# Patient Record
Sex: FEMALE | Race: OTHER | Employment: OTHER | URBAN - NONMETROPOLITAN AREA
[De-identification: names, ages, dates, MRNs, and addresses within clinical notes are randomized per-mention and may not be internally consistent; named-entity substitution may affect disease eponyms.]

---

## 2017-08-02 PROBLEM — I48.0 PAROXYSMAL ATRIAL FIBRILLATION (HCC): Status: ACTIVE | Noted: 2017-08-02

## 2018-02-23 ENCOUNTER — APPOINTMENT (OUTPATIENT)
Dept: CT IMAGING | Age: 83
End: 2018-02-23
Payer: MEDICARE

## 2018-02-23 ENCOUNTER — HOSPITAL ENCOUNTER (EMERGENCY)
Age: 83
Discharge: HOME | End: 2018-02-23
Attending: EMERGENCY MEDICINE
Payer: MEDICARE

## 2018-02-23 VITALS
OXYGEN SATURATION: 100 % | RESPIRATION RATE: 10 BRPM | BODY MASS INDEX: 30.3 KG/M2 | DIASTOLIC BLOOD PRESSURE: 82 MMHG | TEMPERATURE: 97.8 F | WEIGHT: 150 LBS | HEART RATE: 52 BPM | SYSTOLIC BLOOD PRESSURE: 182 MMHG

## 2018-02-23 DIAGNOSIS — R40.4 TRANSIENT ALTERATION OF AWARENESS: Primary | ICD-10-CM

## 2018-02-23 LAB
-: ABNORMAL
ABSOLUTE EOS #: 0.1 K/UL (ref 0–0.4)
ABSOLUTE IMMATURE GRANULOCYTE: ABNORMAL K/UL (ref 0–0.3)
ABSOLUTE LYMPH #: 0.8 K/UL (ref 1–4.8)
ABSOLUTE MONO #: 0.4 K/UL (ref 0–1)
ALBUMIN SERPL-MCNC: 3.7 G/DL (ref 3.5–5.2)
ALBUMIN/GLOBULIN RATIO: 0.8 (ref 1–2.5)
ALP BLD-CCNC: 50 U/L (ref 35–104)
ALT SERPL-CCNC: 13 U/L (ref 5–33)
AMORPHOUS: ABNORMAL
AMPHETAMINE SCREEN URINE: NEGATIVE
ANION GAP SERPL CALCULATED.3IONS-SCNC: 12 MMOL/L (ref 9–17)
AST SERPL-CCNC: 22 U/L
BACTERIA: ABNORMAL
BARBITURATE SCREEN URINE: NEGATIVE
BASOPHILS # BLD: 0 % (ref 0–2)
BASOPHILS ABSOLUTE: 0 K/UL (ref 0–0.2)
BENZODIAZEPINE SCREEN, URINE: NEGATIVE
BILIRUB SERPL-MCNC: 0.37 MG/DL (ref 0.3–1.2)
BILIRUBIN URINE: NEGATIVE
BUN BLDV-MCNC: 15 MG/DL (ref 8–23)
BUN/CREAT BLD: 19 (ref 9–20)
BUPRENORPHINE URINE: NEGATIVE
CALCIUM SERPL-MCNC: 9.1 MG/DL (ref 8.6–10.4)
CANNABINOID SCREEN URINE: NEGATIVE
CASTS UA: ABNORMAL /LPF
CHLORIDE BLD-SCNC: 97 MMOL/L (ref 98–107)
CHP ED QC CHECK: NORMAL
CO2: 25 MMOL/L (ref 20–31)
COCAINE METABOLITE, URINE: NEGATIVE
COLOR: YELLOW
COMMENT UA: ABNORMAL
CREAT SERPL-MCNC: 0.77 MG/DL (ref 0.5–0.9)
CRYSTALS, UA: ABNORMAL /HPF
DIFFERENTIAL TYPE: ABNORMAL
EKG ATRIAL RATE: 57 BPM
EKG P AXIS: 78 DEGREES
EKG P-R INTERVAL: 160 MS
EKG Q-T INTERVAL: 514 MS
EKG QRS DURATION: 106 MS
EKG QTC CALCULATION (BAZETT): 500 MS
EKG R AXIS: -54 DEGREES
EKG T AXIS: 23 DEGREES
EKG VENTRICULAR RATE: 57 BPM
EOSINOPHILS RELATIVE PERCENT: 1 % (ref 0–8)
EPITHELIAL CELLS UA: ABNORMAL /HPF (ref 0–25)
GFR AFRICAN AMERICAN: >60 ML/MIN
GFR NON-AFRICAN AMERICAN: >60 ML/MIN
GFR SERPL CREATININE-BSD FRML MDRD: ABNORMAL ML/MIN/{1.73_M2}
GFR SERPL CREATININE-BSD FRML MDRD: ABNORMAL ML/MIN/{1.73_M2}
GLUCOSE BLD-MCNC: 82 MG/DL
GLUCOSE BLD-MCNC: 82 MG/DL (ref 74–100)
GLUCOSE BLD-MCNC: 83 MG/DL (ref 70–99)
GLUCOSE URINE: NEGATIVE
HCT VFR BLD CALC: 37.8 % (ref 36–46)
HEMOGLOBIN: 12.7 G/DL (ref 12–16)
IMMATURE GRANULOCYTES: ABNORMAL %
INR BLD: 1 (ref 0.9–1.2)
KETONES, URINE: NEGATIVE
LEUKOCYTE ESTERASE, URINE: NEGATIVE
LYMPHOCYTES # BLD: 12 % (ref 24–44)
MCH RBC QN AUTO: 31.9 PG (ref 26–34)
MCHC RBC AUTO-ENTMCNC: 33.7 G/DL (ref 31–37)
MCV RBC AUTO: 94.8 FL (ref 80–100)
MDMA URINE: NORMAL
METHADONE SCREEN, URINE: NEGATIVE
METHAMPHETAMINE, URINE: NEGATIVE
MONOCYTES # BLD: 6 % (ref 0–12)
MUCUS: ABNORMAL
NITRITE, URINE: NEGATIVE
NRBC AUTOMATED: ABNORMAL PER 100 WBC
OPIATES, URINE: NEGATIVE
OTHER OBSERVATIONS UA: ABNORMAL
OXYCODONE SCREEN URINE: NEGATIVE
PARTIAL THROMBOPLASTIN TIME: 22.3 SEC (ref 23.2–34.4)
PDW BLD-RTO: 13.9 % (ref 12.1–15.2)
PH UA: 6 (ref 5–9)
PHENCYCLIDINE, URINE: NEGATIVE
PLATELET # BLD: 151 K/UL (ref 140–450)
PLATELET ESTIMATE: ABNORMAL
PMV BLD AUTO: 10.7 FL (ref 6–12)
POTASSIUM SERPL-SCNC: 4.2 MMOL/L (ref 3.7–5.3)
PROPOXYPHENE, URINE: NEGATIVE
PROTEIN UA: NEGATIVE
PROTHROMBIN TIME: 10.6 SEC (ref 9.7–12.2)
RBC # BLD: 3.98 M/UL (ref 4–5.2)
RBC # BLD: ABNORMAL 10*6/UL
RBC UA: ABNORMAL /HPF (ref 0–2)
RENAL EPITHELIAL, UA: ABNORMAL /HPF
SEG NEUTROPHILS: 81 % (ref 36–66)
SEGMENTED NEUTROPHILS ABSOLUTE COUNT: 5.5 K/UL (ref 1.8–7.7)
SODIUM BLD-SCNC: 134 MMOL/L (ref 135–144)
SPECIFIC GRAVITY UA: 1.01 (ref 1.01–1.02)
TEST INFORMATION: NORMAL
TOTAL PROTEIN: 8.3 G/DL (ref 6.4–8.3)
TRICHOMONAS: ABNORMAL
TRICYCLIC ANTIDEPRESSANTS, UR: NEGATIVE
TURBIDITY: CLEAR
URINE HGB: NEGATIVE
UROBILINOGEN, URINE: ABNORMAL
WBC # BLD: 6.8 K/UL (ref 3.5–11)
WBC # BLD: ABNORMAL 10*3/UL
WBC UA: ABNORMAL /HPF (ref 0–5)
YEAST: ABNORMAL

## 2018-02-23 PROCEDURE — 85025 COMPLETE CBC W/AUTO DIFF WBC: CPT

## 2018-02-23 PROCEDURE — 85610 PROTHROMBIN TIME: CPT

## 2018-02-23 PROCEDURE — 80053 COMPREHEN METABOLIC PANEL: CPT

## 2018-02-23 PROCEDURE — 81001 URINALYSIS AUTO W/SCOPE: CPT

## 2018-02-23 PROCEDURE — 82947 ASSAY GLUCOSE BLOOD QUANT: CPT

## 2018-02-23 PROCEDURE — 99285 EMERGENCY DEPT VISIT HI MDM: CPT

## 2018-02-23 PROCEDURE — 80306 DRUG TEST PRSMV INSTRMNT: CPT

## 2018-02-23 PROCEDURE — 85730 THROMBOPLASTIN TIME PARTIAL: CPT

## 2018-02-23 PROCEDURE — 93005 ELECTROCARDIOGRAM TRACING: CPT

## 2018-02-23 PROCEDURE — 70450 CT HEAD/BRAIN W/O DYE: CPT

## 2018-02-23 ASSESSMENT — ENCOUNTER SYMPTOMS
CONSTIPATION: 0
BACK PAIN: 0
VOMITING: 0
ABDOMINAL PAIN: 0
SORE THROAT: 0
DIARRHEA: 0
EYE REDNESS: 0
CHEST TIGHTNESS: 0
BLOOD IN STOOL: 0
COUGH: 0
NAUSEA: 0
SHORTNESS OF BREATH: 0
EYE DISCHARGE: 0
RHINORRHEA: 0
WHEEZING: 0

## 2018-02-23 NOTE — ED NOTES
Bed: 02  Expected date:   Expected time:   Means of arrival:   Comments:  [facility]  02/23/18 0339

## 2018-02-23 NOTE — ED NOTES
Daughter states that pt does not take medications even when given to her. Daughter states that pt hides her meds.       Bita Panda RN  02/23/18 9145

## 2018-02-23 NOTE — ED NOTES
Pt ambulates down the reina, slightly unsteady reaches out for hand rail at times. Upon return to room states I do feel a little dizzy but I'm OK. Continues to deny help from RN. Pt remains very pleasant and smiling. Family states \"this is how she acts . .. At home she take her pills in her hand and sneaks over to the sink and throws out them down the garbage disposal instead of taking them, she always refuses help just very proud\".      Neel Davis RN  02/23/18 3051

## 2018-02-23 NOTE — ED NOTES
Family updated on plan of care. Family concerned about BP, explained that physician will be in to discuss test results shortly.       Karey Degroot RN  02/23/18 1907

## 2018-02-24 NOTE — ED NOTES
Patient ambulated to restroom with stand by assist. Patient tolerated well. Patient had little to no gait disturbance.       Michelle Pacheco RN  02/23/18 1945

## 2019-03-26 ENCOUNTER — HOSPITAL ENCOUNTER (OUTPATIENT)
Age: 84
Discharge: HOME | End: 2019-03-26
Payer: MEDICARE

## 2019-03-26 DIAGNOSIS — E78.5 HYPERLIPIDEMIA, UNSPECIFIED HYPERLIPIDEMIA TYPE: ICD-10-CM

## 2019-03-26 DIAGNOSIS — E11.9 DIABETES MELLITUS WITHOUT COMPLICATION (HCC): ICD-10-CM

## 2019-03-26 DIAGNOSIS — I10 ESSENTIAL HYPERTENSION: ICD-10-CM

## 2019-03-26 LAB
ALBUMIN SERPL-MCNC: 3.5 G/DL (ref 3.5–5.2)
ALBUMIN/GLOBULIN RATIO: 0.8 (ref 1–2.5)
ALP BLD-CCNC: 58 U/L (ref 35–104)
ALT SERPL-CCNC: 11 U/L (ref 5–33)
ANION GAP SERPL CALCULATED.3IONS-SCNC: 11 MMOL/L (ref 9–17)
AST SERPL-CCNC: 25 U/L
BILIRUB SERPL-MCNC: 0.63 MG/DL (ref 0.3–1.2)
BUN BLDV-MCNC: 22 MG/DL (ref 8–23)
BUN/CREAT BLD: 22 (ref 9–20)
CALCIUM SERPL-MCNC: 9.7 MG/DL (ref 8.6–10.4)
CHLORIDE BLD-SCNC: 93 MMOL/L (ref 98–107)
CHOLESTEROL/HDL RATIO: 2.8
CHOLESTEROL: 182 MG/DL
CO2: 27 MMOL/L (ref 20–31)
CREAT SERPL-MCNC: 0.98 MG/DL (ref 0.5–0.9)
GFR AFRICAN AMERICAN: >60 ML/MIN
GFR NON-AFRICAN AMERICAN: 54 ML/MIN
GFR SERPL CREATININE-BSD FRML MDRD: ABNORMAL ML/MIN/{1.73_M2}
GFR SERPL CREATININE-BSD FRML MDRD: ABNORMAL ML/MIN/{1.73_M2}
GLUCOSE BLD-MCNC: 126 MG/DL (ref 70–99)
HCT VFR BLD CALC: 39.5 % (ref 36.3–47.1)
HDLC SERPL-MCNC: 65 MG/DL
HEMOGLOBIN: 13.6 G/DL (ref 11.9–15.1)
LDL CHOLESTEROL: 98 MG/DL (ref 0–130)
MCH RBC QN AUTO: 31.9 PG (ref 25.2–33.5)
MCHC RBC AUTO-ENTMCNC: 34.4 G/DL (ref 28.4–34.8)
MCV RBC AUTO: 92.7 FL (ref 82.6–102.9)
NRBC AUTOMATED: 0 PER 100 WBC
PDW BLD-RTO: 13.2 % (ref 11.8–14.4)
PLATELET # BLD: 141 K/UL (ref 138–453)
PMV BLD AUTO: 11.8 FL (ref 8.1–13.5)
POTASSIUM SERPL-SCNC: 4.6 MMOL/L (ref 3.7–5.3)
RBC # BLD: 4.26 M/UL (ref 3.95–5.11)
SODIUM BLD-SCNC: 131 MMOL/L (ref 135–144)
TOTAL PROTEIN: 7.7 G/DL (ref 6.4–8.3)
TRIGL SERPL-MCNC: 95 MG/DL
TSH SERPL DL<=0.05 MIU/L-ACNC: 0.68 MIU/L (ref 0.3–5)
VLDLC SERPL CALC-MCNC: NORMAL MG/DL (ref 1–30)
WBC # BLD: 6 K/UL (ref 3.5–11.3)

## 2019-03-26 PROCEDURE — 80061 LIPID PANEL: CPT

## 2019-03-26 PROCEDURE — 80053 COMPREHEN METABOLIC PANEL: CPT

## 2019-03-26 PROCEDURE — 85027 COMPLETE CBC AUTOMATED: CPT

## 2019-03-26 PROCEDURE — 84443 ASSAY THYROID STIM HORMONE: CPT

## 2019-03-26 PROCEDURE — 36415 COLL VENOUS BLD VENIPUNCTURE: CPT

## 2019-05-21 ENCOUNTER — HOSPITAL ENCOUNTER (EMERGENCY)
Age: 84
Discharge: HOME | End: 2019-05-21
Attending: EMERGENCY MEDICINE
Payer: MEDICARE

## 2019-05-21 VITALS
TEMPERATURE: 96.9 F | RESPIRATION RATE: 21 BRPM | OXYGEN SATURATION: 100 % | SYSTOLIC BLOOD PRESSURE: 168 MMHG | HEART RATE: 63 BPM | DIASTOLIC BLOOD PRESSURE: 91 MMHG

## 2019-05-21 DIAGNOSIS — R11.2 NON-INTRACTABLE VOMITING WITH NAUSEA, UNSPECIFIED VOMITING TYPE: Primary | ICD-10-CM

## 2019-05-21 LAB
ABSOLUTE EOS #: 0 K/UL (ref 0–0.44)
ABSOLUTE IMMATURE GRANULOCYTE: 0 K/UL (ref 0–0.3)
ABSOLUTE LYMPH #: 0.81 K/UL (ref 1.1–3.7)
ABSOLUTE MONO #: 0.37 K/UL (ref 0.1–1.2)
ALBUMIN SERPL-MCNC: 3.4 G/DL (ref 3.5–5.2)
ALBUMIN/GLOBULIN RATIO: 0.9 (ref 1–2.5)
ALP BLD-CCNC: 70 U/L (ref 35–104)
ALT SERPL-CCNC: 8 U/L (ref 5–33)
ANION GAP SERPL CALCULATED.3IONS-SCNC: 12 MMOL/L (ref 9–17)
AST SERPL-CCNC: 19 U/L
BASOPHILS # BLD: 1 % (ref 0–2)
BASOPHILS ABSOLUTE: 0.06 K/UL (ref 0–0.2)
BILIRUB SERPL-MCNC: 0.32 MG/DL (ref 0.3–1.2)
BUN BLDV-MCNC: 14 MG/DL (ref 8–23)
BUN/CREAT BLD: 18 (ref 9–20)
CALCIUM SERPL-MCNC: 9 MG/DL (ref 8.6–10.4)
CHLORIDE BLD-SCNC: 96 MMOL/L (ref 98–107)
CO2: 24 MMOL/L (ref 20–31)
CREAT SERPL-MCNC: 0.76 MG/DL (ref 0.5–0.9)
DIFFERENTIAL TYPE: ABNORMAL
EOSINOPHILS RELATIVE PERCENT: 0 % (ref 1–4)
GFR AFRICAN AMERICAN: >60 ML/MIN
GFR NON-AFRICAN AMERICAN: >60 ML/MIN
GFR SERPL CREATININE-BSD FRML MDRD: ABNORMAL ML/MIN/{1.73_M2}
GFR SERPL CREATININE-BSD FRML MDRD: ABNORMAL ML/MIN/{1.73_M2}
GLUCOSE BLD-MCNC: 170 MG/DL (ref 70–99)
HCT VFR BLD CALC: 35.5 % (ref 36.3–47.1)
HEMOGLOBIN: 11.8 G/DL (ref 11.9–15.1)
IMMATURE GRANULOCYTES: 0 %
LIPASE: 44 U/L (ref 13–60)
LYMPHOCYTES # BLD: 13 % (ref 24–43)
MAGNESIUM: 1.9 MG/DL (ref 1.6–2.6)
MCH RBC QN AUTO: 32.1 PG (ref 25.2–33.5)
MCHC RBC AUTO-ENTMCNC: 33.2 G/DL (ref 28.4–34.8)
MCV RBC AUTO: 96.5 FL (ref 82.6–102.9)
MONOCYTES # BLD: 6 % (ref 3–12)
MORPHOLOGY: NORMAL
NRBC AUTOMATED: 0 PER 100 WBC
PDW BLD-RTO: 13.8 % (ref 11.8–14.4)
PLATELET # BLD: 146 K/UL (ref 138–453)
PLATELET ESTIMATE: ABNORMAL
PMV BLD AUTO: 11 FL (ref 8.1–13.5)
POTASSIUM SERPL-SCNC: 3.4 MMOL/L (ref 3.7–5.3)
RBC # BLD: 3.68 M/UL (ref 3.95–5.11)
RBC # BLD: ABNORMAL 10*6/UL
SEG NEUTROPHILS: 80 % (ref 36–65)
SEGMENTED NEUTROPHILS ABSOLUTE COUNT: 4.96 K/UL (ref 1.5–8.1)
SODIUM BLD-SCNC: 132 MMOL/L (ref 135–144)
TOTAL PROTEIN: 7.4 G/DL (ref 6.4–8.3)
WBC # BLD: 6.2 K/UL (ref 3.5–11.3)
WBC # BLD: ABNORMAL 10*3/UL

## 2019-05-21 PROCEDURE — 85025 COMPLETE CBC W/AUTO DIFF WBC: CPT

## 2019-05-21 PROCEDURE — 93005 ELECTROCARDIOGRAM TRACING: CPT | Performed by: EMERGENCY MEDICINE

## 2019-05-21 PROCEDURE — 6360000002 HC RX W HCPCS: Performed by: EMERGENCY MEDICINE

## 2019-05-21 PROCEDURE — 99284 EMERGENCY DEPT VISIT MOD MDM: CPT

## 2019-05-21 PROCEDURE — 36415 COLL VENOUS BLD VENIPUNCTURE: CPT

## 2019-05-21 PROCEDURE — 83690 ASSAY OF LIPASE: CPT

## 2019-05-21 PROCEDURE — 2580000003 HC RX 258: Performed by: EMERGENCY MEDICINE

## 2019-05-21 PROCEDURE — 96374 THER/PROPH/DIAG INJ IV PUSH: CPT

## 2019-05-21 PROCEDURE — 83735 ASSAY OF MAGNESIUM: CPT

## 2019-05-21 PROCEDURE — 80053 COMPREHEN METABOLIC PANEL: CPT

## 2019-05-21 PROCEDURE — 93005 ELECTROCARDIOGRAM TRACING: CPT

## 2019-05-21 RX ORDER — ONDANSETRON 4 MG/1
4 TABLET, ORALLY DISINTEGRATING ORAL EVERY 8 HOURS PRN
Qty: 10 TABLET | Refills: 0 | Status: SHIPPED | OUTPATIENT
Start: 2019-05-21 | End: 2019-12-17

## 2019-05-21 RX ORDER — ONDANSETRON 2 MG/ML
4 INJECTION INTRAMUSCULAR; INTRAVENOUS ONCE
Status: COMPLETED | OUTPATIENT
Start: 2019-05-21 | End: 2019-05-21

## 2019-05-21 RX ORDER — 0.9 % SODIUM CHLORIDE 0.9 %
1000 INTRAVENOUS SOLUTION INTRAVENOUS ONCE
Status: COMPLETED | OUTPATIENT
Start: 2019-05-21 | End: 2019-05-21

## 2019-05-21 RX ADMIN — ONDANSETRON 4 MG: 2 INJECTION INTRAMUSCULAR; INTRAVENOUS at 18:58

## 2019-05-21 RX ADMIN — SODIUM CHLORIDE 1000 ML: 9 INJECTION, SOLUTION INTRAVENOUS at 18:57

## 2019-05-21 ASSESSMENT — ENCOUNTER SYMPTOMS
NAUSEA: 1
BACK PAIN: 0
VOMITING: 1
ABDOMINAL PAIN: 0
COUGH: 0
DIARRHEA: 0
FACIAL SWELLING: 0
SHORTNESS OF BREATH: 0

## 2019-05-21 NOTE — ED PROVIDER NOTES
name: None    Number of children: None    Years of education: None    Highest education level: None   Occupational History    None   Social Needs    Financial resource strain: None    Food insecurity:     Worry: None     Inability: None    Transportation needs:     Medical: None     Non-medical: None   Tobacco Use    Smoking status: Never Smoker    Smokeless tobacco: Never Used   Substance and Sexual Activity    Alcohol use: No    Drug use: None    Sexual activity: None   Lifestyle    Physical activity:     Days per week: None     Minutes per session: None    Stress: None   Relationships    Social connections:     Talks on phone: None     Gets together: None     Attends Faith service: None     Active member of club or organization: None     Attends meetings of clubs or organizations: None     Relationship status: None    Intimate partner violence:     Fear of current or ex partner: None     Emotionally abused: None     Physically abused: None     Forced sexual activity: None   Other Topics Concern    None   Social History Narrative    None       SCREENINGS      @FLOW([id number])@      PHYSICAL EXAM    (up to 7 for level 4, 8 or more for level 5)     ED Triage Vitals [05/21/19 1829]   BP Temp Temp Source Pulse Resp SpO2 Height Weight   (!) 205/84 96.9 °F (36.1 °C) Tympanic 53 13 100 % -- --       Physical Exam   Constitutional: She is oriented to person, place, and time. She appears well-developed and well-nourished. No distress. HENT:   Head: Normocephalic and atraumatic. Mouth/Throat: Oropharynx is clear and moist.   Eyes: Pupils are equal, round, and reactive to light. Conjunctivae and EOM are normal. Right eye exhibits no discharge. Left eye exhibits no discharge. Neck: Normal range of motion. Neck supple. Cardiovascular: Normal rate and regular rhythm. No murmur heard. Pulmonary/Chest: Effort normal and breath sounds normal. No stridor. No respiratory distress. Abdominal: Soft. Bowel sounds are normal. She exhibits no distension. There is no tenderness. Musculoskeletal: Normal range of motion. Neurological: She is alert and oriented to person, place, and time. No cranial nerve deficit. Skin: Skin is warm and dry. Nursing note and vitals reviewed. DIAGNOSTIC RESULTS     EKG: All EKG's are interpreted by the Emergency Department Physician who either signs or Co-signsthis chart in the absence of a cardiologist.  EKG demonstrates sinus rhythm. There is a rate of 60. Mildly prolonged QT, unchanged. No acute ischemia. RADIOLOGY:   Non-plain filmimages such as CT, Ultrasound and MRI are read by the radiologist. Plain radiographic images are visualized and preliminarily interpreted by the emergency physician with the below findings:        Interpretation per the Radiologist below, if available at the time ofthis note:    No orders to display         ED BEDSIDE ULTRASOUND:   Performed by ED Physician - none    LABS:  Labs Reviewed   CBC WITH AUTO DIFFERENTIAL - Abnormal; Notable for the following components:       Result Value    RBC 3.68 (*)     Hemoglobin 11.8 (*)     Hematocrit 35.5 (*)     All other components within normal limits   COMPREHENSIVE METABOLIC PANEL W/ REFLEX TO MG FOR LOW K - Abnormal; Notable for the following components:    Glucose 170 (*)     Sodium 132 (*)     Potassium 3.4 (*)     Chloride 96 (*)     Alb 3.4 (*)     Albumin/Globulin Ratio 0.9 (*)     All other components within normal limits   LIPASE   MAGNESIUM   URINE RT REFLEX TO CULTURE       All other labs were within normal range or not returned as of this dictation. EMERGENCY DEPARTMENT COURSE and DIFFERENTIAL DIAGNOSIS/MDM:   Vitals:    Vitals:    05/21/19 1829 05/21/19 1903 05/21/19 1916   BP: (!) 205/84 (!) 198/103 (!) 168/91   Pulse: 53 54 63   Resp: 13 11 21   Temp: 96.9 °F (36.1 °C)     TempSrc: Tympanic     SpO2: 100%         Patient presents with nausea and vomiting.   Her abdomen is completely benign. She's had no chest pain or dyspnea. On arrival, the patient was hypertensive, but has not taken her medications. Without any intervention, her blood pressure trended back towards the normal range. The patient was given fluids and Zofran. Labs are obtained. Renal function is within normal limits. Electrolytes are basically unremarkable. The patient is feeling markedly improved. She is able to drink without issue. This point, I do not suspect a dangerous process. I do feel the patient is safe for outpatient therapy. She was counseled concerning symptoms. She will be discharged home. MDM        CONSULTS:  None    PROCEDURES:  Unless otherwise noted below, none     Procedures    FINAL IMPRESSION      1. Non-intractable vomiting with nausea, unspecified vomiting type        DISPOSITION/PLAN   DISPOSITION        PATIENT REFERRED TO:  Jessica Morrow MD  [facility] [address]  [phone]    In 2 days  or return if symptoms worsen      DISCHARGE MEDICATIONS:  New Prescriptions    ONDANSETRON (ZOFRAN ODT) 4 MG DISINTEGRATING TABLET    Take 1 tablet by mouth every 8 hours as needed for Nausea          (Please note that portions of this note were completed with a voice recognitionprogram.  Efforts were made to edit the dictations but occasionally words are mis-transcribed. )    Judy Alejandro MD (electronically signed)  Attending Emergency Physician         Judy Alejandro MD  05/21/19 1565

## 2019-05-21 NOTE — ED NOTES
Bed: 05  Expected date:   Expected time:   Means of arrival:   Comments:  julius Barrett, [facility]  05/21/19 0728

## 2019-05-22 LAB
EKG ATRIAL RATE: 60 BPM
EKG P AXIS: 85 DEGREES
EKG P-R INTERVAL: 202 MS
EKG Q-T INTERVAL: 494 MS
EKG QRS DURATION: 96 MS
EKG QTC CALCULATION (BAZETT): 494 MS
EKG R AXIS: -64 DEGREES
EKG T AXIS: 12 DEGREES
EKG VENTRICULAR RATE: 60 BPM

## 2019-05-22 NOTE — ED NOTES
Patient assisted to bathroom. Tolerates crackers and water before discharge.         Isauro Alvarenga RN  05/21/19 2014

## 2019-11-23 ENCOUNTER — HOSPITAL ENCOUNTER (EMERGENCY)
Age: 84
Discharge: ANOTHER HOSPITAL | End: 2019-11-23
Attending: EMERGENCY MEDICINE
Payer: MEDICARE

## 2019-11-23 ENCOUNTER — APPOINTMENT (OUTPATIENT)
Dept: CT IMAGING | Age: 84
End: 2019-11-23
Payer: MEDICARE

## 2019-11-23 VITALS
SYSTOLIC BLOOD PRESSURE: 171 MMHG | RESPIRATION RATE: 16 BRPM | HEART RATE: 69 BPM | OXYGEN SATURATION: 92 % | DIASTOLIC BLOOD PRESSURE: 114 MMHG | BODY MASS INDEX: 25.52 KG/M2 | WEIGHT: 130 LBS | HEIGHT: 60 IN

## 2019-11-23 DIAGNOSIS — I63.9 ISCHEMIC STROKE (HCC): Primary | ICD-10-CM

## 2019-11-23 LAB
% CKMB: 4.6 % (ref 0–3)
ABSOLUTE EOS #: 0.18 K/UL (ref 0–0.44)
ABSOLUTE IMMATURE GRANULOCYTE: <0.03 K/UL (ref 0–0.3)
ABSOLUTE LYMPH #: 1.81 K/UL (ref 1.1–3.7)
ABSOLUTE MONO #: 0.57 K/UL (ref 0.1–1.2)
ANION GAP SERPL CALCULATED.3IONS-SCNC: 15 MMOL/L (ref 9–17)
BASOPHILS # BLD: 1 % (ref 0–2)
BASOPHILS ABSOLUTE: 0.03 K/UL (ref 0–0.2)
BUN BLDV-MCNC: 16 MG/DL (ref 8–23)
BUN/CREAT BLD: 19 (ref 9–20)
CALCIUM SERPL-MCNC: 9.1 MG/DL (ref 8.6–10.4)
CHLORIDE BLD-SCNC: 97 MMOL/L (ref 98–107)
CK MB: 3.6 NG/ML
CKMB INTERPRETATION: ABNORMAL
CO2: 21 MMOL/L (ref 20–31)
CREAT SERPL-MCNC: 0.83 MG/DL (ref 0.5–0.9)
DIFFERENTIAL TYPE: ABNORMAL
EOSINOPHILS RELATIVE PERCENT: 3 % (ref 1–4)
GFR AFRICAN AMERICAN: >60 ML/MIN
GFR NON-AFRICAN AMERICAN: >60 ML/MIN
GFR SERPL CREATININE-BSD FRML MDRD: ABNORMAL ML/MIN/{1.73_M2}
GFR SERPL CREATININE-BSD FRML MDRD: ABNORMAL ML/MIN/{1.73_M2}
GLUCOSE BLD-MCNC: 140 MG/DL (ref 74–100)
GLUCOSE BLD-MCNC: 144 MG/DL (ref 70–99)
HCT VFR BLD CALC: 32.4 % (ref 36.3–47.1)
HEMOGLOBIN: 10.9 G/DL (ref 11.9–15.1)
IMMATURE GRANULOCYTES: 0 %
INR BLD: 1 (ref 0.9–1.2)
LYMPHOCYTES # BLD: 30 % (ref 24–43)
MCH RBC QN AUTO: 32 PG (ref 25.2–33.5)
MCHC RBC AUTO-ENTMCNC: 33.6 G/DL (ref 28.4–34.8)
MCV RBC AUTO: 95 FL (ref 82.6–102.9)
MONOCYTES # BLD: 9 % (ref 3–12)
MYOGLOBIN: 53 NG/ML (ref 25–58)
NRBC AUTOMATED: 0 PER 100 WBC
PARTIAL THROMBOPLASTIN TIME: 20.9 SEC (ref 23.2–34.4)
PDW BLD-RTO: 14.1 % (ref 11.8–14.4)
PLATELET # BLD: 152 K/UL (ref 138–453)
PLATELET ESTIMATE: ABNORMAL
PMV BLD AUTO: 11.8 FL (ref 8.1–13.5)
POTASSIUM SERPL-SCNC: 3.3 MMOL/L (ref 3.7–5.3)
PROTHROMBIN TIME: 10.2 SEC (ref 9.7–12.2)
RBC # BLD: 3.41 M/UL (ref 3.95–5.11)
RBC # BLD: ABNORMAL 10*6/UL
SEG NEUTROPHILS: 57 % (ref 36–65)
SEGMENTED NEUTROPHILS ABSOLUTE COUNT: 3.53 K/UL (ref 1.5–8.1)
SODIUM BLD-SCNC: 133 MMOL/L (ref 135–144)
TOTAL CK: 78 U/L (ref 26–192)
TROPONIN INTERP: ABNORMAL
TROPONIN T: <0.03 NG/ML
TROPONIN, HIGH SENSITIVITY: ABNORMAL NG/L (ref 0–14)
WBC # BLD: 6.1 K/UL (ref 3.5–11.3)
WBC # BLD: ABNORMAL 10*3/UL

## 2019-11-23 PROCEDURE — 70450 CT HEAD/BRAIN W/O DYE: CPT

## 2019-11-23 PROCEDURE — 82550 ASSAY OF CK (CPK): CPT

## 2019-11-23 PROCEDURE — 82947 ASSAY GLUCOSE BLOOD QUANT: CPT

## 2019-11-23 PROCEDURE — 2580000003 HC RX 258: Performed by: EMERGENCY MEDICINE

## 2019-11-23 PROCEDURE — 82553 CREATINE MB FRACTION: CPT

## 2019-11-23 PROCEDURE — 80048 BASIC METABOLIC PNL TOTAL CA: CPT

## 2019-11-23 PROCEDURE — 84484 ASSAY OF TROPONIN QUANT: CPT

## 2019-11-23 PROCEDURE — G0426 INPT/ED TELECONSULT50: HCPCS | Performed by: PSYCHIATRY & NEUROLOGY

## 2019-11-23 PROCEDURE — 83874 ASSAY OF MYOGLOBIN: CPT

## 2019-11-23 PROCEDURE — 2500000003 HC RX 250 WO HCPCS: Performed by: EMERGENCY MEDICINE

## 2019-11-23 PROCEDURE — 96365 THER/PROPH/DIAG IV INF INIT: CPT

## 2019-11-23 PROCEDURE — 37195 THROMBOLYTIC THERAPY STROKE: CPT

## 2019-11-23 PROCEDURE — 99285 EMERGENCY DEPT VISIT HI MDM: CPT

## 2019-11-23 PROCEDURE — 85610 PROTHROMBIN TIME: CPT

## 2019-11-23 PROCEDURE — 85730 THROMBOPLASTIN TIME PARTIAL: CPT

## 2019-11-23 PROCEDURE — 6360000002 HC RX W HCPCS: Performed by: EMERGENCY MEDICINE

## 2019-11-23 PROCEDURE — 85025 COMPLETE CBC W/AUTO DIFF WBC: CPT

## 2019-11-23 PROCEDURE — 96368 THER/DIAG CONCURRENT INF: CPT

## 2019-11-23 RX ORDER — LABETALOL HYDROCHLORIDE 5 MG/ML
10 INJECTION, SOLUTION INTRAVENOUS ONCE
Status: DISCONTINUED | OUTPATIENT
Start: 2019-11-23 | End: 2019-11-23

## 2019-11-23 RX ORDER — DEXTROSE MONOHYDRATE 25 G/50ML
12.5 INJECTION, SOLUTION INTRAVENOUS
Status: DISCONTINUED | OUTPATIENT
Start: 2019-11-23 | End: 2019-11-23 | Stop reason: HOSPADM

## 2019-11-23 RX ORDER — 0.9 % SODIUM CHLORIDE 0.9 %
1000 INTRAVENOUS SOLUTION INTRAVENOUS ONCE
Status: COMPLETED | OUTPATIENT
Start: 2019-11-23 | End: 2019-11-23

## 2019-11-23 RX ORDER — SODIUM CHLORIDE 0.9 % (FLUSH) 0.9 %
10 SYRINGE (ML) INJECTION EVERY 12 HOURS SCHEDULED
Status: DISCONTINUED | OUTPATIENT
Start: 2019-11-23 | End: 2019-11-24 | Stop reason: HOSPADM

## 2019-11-23 RX ORDER — 0.9 % SODIUM CHLORIDE 0.9 %
50 INTRAVENOUS SOLUTION INTRAVENOUS ONCE
Status: COMPLETED | OUTPATIENT
Start: 2019-11-23 | End: 2019-11-23

## 2019-11-23 RX ORDER — SODIUM CHLORIDE 0.9 % (FLUSH) 0.9 %
10 SYRINGE (ML) INJECTION PRN
Status: DISCONTINUED | OUTPATIENT
Start: 2019-11-23 | End: 2019-11-24 | Stop reason: HOSPADM

## 2019-11-23 RX ADMIN — NICARDIPINE HYDROCHLORIDE 5 MG/HR: 0.2 INJECTION, SOLUTION INTRAVENOUS at 20:35

## 2019-11-23 RX ADMIN — SODIUM CHLORIDE 1000 ML: 9 INJECTION, SOLUTION INTRAVENOUS at 21:16

## 2019-11-23 RX ADMIN — ALTEPLASE 5.3 MG: KIT at 20:45

## 2019-11-23 RX ADMIN — SODIUM CHLORIDE 50 ML: 9 INJECTION, SOLUTION INTRAVENOUS at 21:16

## 2019-11-23 RX ADMIN — ALTEPLASE 47.8 MG: KIT at 20:46

## 2019-11-23 ASSESSMENT — ENCOUNTER SYMPTOMS: COLOR CHANGE: 0

## 2019-12-31 ENCOUNTER — HOSPITAL ENCOUNTER (OUTPATIENT)
Age: 84
Setting detail: SPECIMEN
Discharge: HOME | End: 2019-12-31
Payer: MEDICARE

## 2019-12-31 LAB
ABSOLUTE EOS #: 0 K/UL (ref 0–0.44)
ABSOLUTE IMMATURE GRANULOCYTE: 0 K/UL (ref 0–0.3)
ABSOLUTE LYMPH #: 0.85 K/UL (ref 1.1–3.7)
ABSOLUTE MONO #: 0.24 K/UL (ref 0.1–1.2)
ALBUMIN SERPL-MCNC: 3.7 G/DL (ref 3.5–5.2)
ALBUMIN/GLOBULIN RATIO: 1 (ref 1–2.5)
ALP BLD-CCNC: 58 U/L (ref 35–104)
ALT SERPL-CCNC: 11 U/L (ref 5–33)
ANION GAP SERPL CALCULATED.3IONS-SCNC: 11 MMOL/L (ref 9–17)
AST SERPL-CCNC: 21 U/L
BASOPHILS # BLD: 1 % (ref 0–2)
BASOPHILS ABSOLUTE: 0.05 K/UL (ref 0–0.2)
BILIRUB SERPL-MCNC: 0.59 MG/DL (ref 0.3–1.2)
BUN BLDV-MCNC: 17 MG/DL (ref 8–23)
BUN/CREAT BLD: 22 (ref 9–20)
CALCIUM SERPL-MCNC: 9.1 MG/DL (ref 8.6–10.4)
CHLORIDE BLD-SCNC: 100 MMOL/L (ref 98–107)
CO2: 26 MMOL/L (ref 20–31)
CREAT SERPL-MCNC: 0.78 MG/DL (ref 0.5–0.9)
DIFFERENTIAL TYPE: ABNORMAL
EOSINOPHILS RELATIVE PERCENT: 0 % (ref 1–4)
GFR AFRICAN AMERICAN: >60 ML/MIN
GFR NON-AFRICAN AMERICAN: >60 ML/MIN
GFR SERPL CREATININE-BSD FRML MDRD: ABNORMAL ML/MIN/{1.73_M2}
GFR SERPL CREATININE-BSD FRML MDRD: ABNORMAL ML/MIN/{1.73_M2}
GLUCOSE BLD-MCNC: 136 MG/DL (ref 70–99)
HCT VFR BLD CALC: 33.3 % (ref 36.3–47.1)
HEMOGLOBIN: 10.9 G/DL (ref 11.9–15.1)
IMMATURE GRANULOCYTES: 0 %
LYMPHOCYTES # BLD: 18 % (ref 24–43)
MCH RBC QN AUTO: 32 PG (ref 25.2–33.5)
MCHC RBC AUTO-ENTMCNC: 32.7 G/DL (ref 28.4–34.8)
MCV RBC AUTO: 97.7 FL (ref 82.6–102.9)
MONOCYTES # BLD: 5 % (ref 3–12)
MORPHOLOGY: NORMAL
NRBC AUTOMATED: 0 PER 100 WBC
PDW BLD-RTO: 14.1 % (ref 11.8–14.4)
PLATELET # BLD: ABNORMAL K/UL (ref 138–453)
PLATELET ESTIMATE: ABNORMAL
PLATELET, FLUORESCENCE: 154 K/UL (ref 138–453)
PLATELET, IMMATURE FRACTION: 7.9 % (ref 1.1–10.3)
PMV BLD AUTO: ABNORMAL FL (ref 8.1–13.5)
POTASSIUM SERPL-SCNC: 4.1 MMOL/L (ref 3.7–5.3)
RBC # BLD: 3.41 M/UL (ref 3.95–5.11)
RBC # BLD: ABNORMAL 10*6/UL
SEG NEUTROPHILS: 76 % (ref 36–65)
SEGMENTED NEUTROPHILS ABSOLUTE COUNT: 3.56 K/UL (ref 1.5–8.1)
SODIUM BLD-SCNC: 137 MMOL/L (ref 135–144)
TOTAL PROTEIN: 7.4 G/DL (ref 6.4–8.3)
WBC # BLD: 4.7 K/UL (ref 3.5–11.3)
WBC # BLD: ABNORMAL 10*3/UL

## 2019-12-31 PROCEDURE — 80053 COMPREHEN METABOLIC PANEL: CPT

## 2019-12-31 PROCEDURE — 85025 COMPLETE CBC W/AUTO DIFF WBC: CPT

## 2019-12-31 PROCEDURE — 85055 RETICULATED PLATELET ASSAY: CPT

## 2020-01-20 ENCOUNTER — HOSPITAL ENCOUNTER (OUTPATIENT)
Age: 85
Setting detail: SPECIMEN
Discharge: HOME | End: 2020-01-20
Payer: MEDICARE

## 2020-01-20 PROCEDURE — 87086 URINE CULTURE/COLONY COUNT: CPT

## 2020-01-21 LAB
CULTURE: NORMAL
Lab: NORMAL
SPECIMEN DESCRIPTION: NORMAL

## 2020-01-23 ENCOUNTER — HOSPITAL ENCOUNTER (EMERGENCY)
Age: 85
Discharge: HOME | DRG: 310 | End: 2020-01-23
Payer: MEDICARE

## 2020-01-23 VITALS
TEMPERATURE: 97.7 F | OXYGEN SATURATION: 100 % | RESPIRATION RATE: 18 BRPM | WEIGHT: 139 LBS | DIASTOLIC BLOOD PRESSURE: 93 MMHG | HEIGHT: 62 IN | SYSTOLIC BLOOD PRESSURE: 157 MMHG | HEART RATE: 96 BPM | BODY MASS INDEX: 25.58 KG/M2

## 2020-01-23 LAB
ANION GAP SERPL CALCULATED.3IONS-SCNC: 14 MMOL/L (ref 9–17)
BUN BLDV-MCNC: 13 MG/DL (ref 8–23)
BUN/CREAT BLD: 16 (ref 9–20)
CALCIUM SERPL-MCNC: 8.8 MG/DL (ref 8.6–10.4)
CHLORIDE BLD-SCNC: 98 MMOL/L (ref 98–107)
CO2: 22 MMOL/L (ref 20–31)
CREAT SERPL-MCNC: 0.79 MG/DL (ref 0.5–0.9)
EKG ATRIAL RATE: 87 BPM
EKG Q-T INTERVAL: 386 MS
EKG QRS DURATION: 94 MS
EKG QTC CALCULATION (BAZETT): 515 MS
EKG R AXIS: -69 DEGREES
EKG T AXIS: 52 DEGREES
EKG VENTRICULAR RATE: 107 BPM
GFR AFRICAN AMERICAN: >60 ML/MIN
GFR NON-AFRICAN AMERICAN: >60 ML/MIN
GFR SERPL CREATININE-BSD FRML MDRD: ABNORMAL ML/MIN/{1.73_M2}
GFR SERPL CREATININE-BSD FRML MDRD: ABNORMAL ML/MIN/{1.73_M2}
GLUCOSE BLD-MCNC: 192 MG/DL (ref 70–99)
HCT VFR BLD CALC: 29.8 % (ref 36.3–47.1)
HEMOGLOBIN: 10.1 G/DL (ref 11.9–15.1)
MCH RBC QN AUTO: 32.4 PG (ref 25.2–33.5)
MCHC RBC AUTO-ENTMCNC: 33.9 G/DL (ref 28.4–34.8)
MCV RBC AUTO: 95.5 FL (ref 82.6–102.9)
NRBC AUTOMATED: 0 PER 100 WBC
PARTIAL THROMBOPLASTIN TIME: 25.4 SEC (ref 23.2–34.4)
PDW BLD-RTO: 14.3 % (ref 11.8–14.4)
PLATELET # BLD: 175 K/UL (ref 138–453)
PMV BLD AUTO: 11.5 FL (ref 8.1–13.5)
POTASSIUM SERPL-SCNC: 3.3 MMOL/L (ref 3.7–5.3)
RBC # BLD: 3.12 M/UL (ref 3.95–5.11)
SODIUM BLD-SCNC: 134 MMOL/L (ref 135–144)
WBC # BLD: 6.2 K/UL (ref 3.5–11.3)

## 2020-01-23 PROCEDURE — 80048 BASIC METABOLIC PNL TOTAL CA: CPT

## 2020-01-23 PROCEDURE — 99283 EMERGENCY DEPT VISIT LOW MDM: CPT

## 2020-01-23 PROCEDURE — 93010 ELECTROCARDIOGRAM REPORT: CPT | Performed by: INTERNAL MEDICINE

## 2020-01-23 PROCEDURE — 93005 ELECTROCARDIOGRAM TRACING: CPT | Performed by: PHYSICIAN ASSISTANT

## 2020-01-23 PROCEDURE — 85730 THROMBOPLASTIN TIME PARTIAL: CPT

## 2020-01-23 PROCEDURE — 6370000000 HC RX 637 (ALT 250 FOR IP): Performed by: PHYSICIAN ASSISTANT

## 2020-01-23 PROCEDURE — 85027 COMPLETE CBC AUTOMATED: CPT

## 2020-01-23 RX ADMIN — DILTIAZEM HYDROCHLORIDE 30 MG: 30 TABLET, FILM COATED ORAL at 16:56

## 2020-01-23 ASSESSMENT — ENCOUNTER SYMPTOMS
VOMITING: 0
SORE THROAT: 0
CONSTIPATION: 0
ABDOMINAL PAIN: 0
BLOOD IN STOOL: 0
RHINORRHEA: 0
NAUSEA: 0
SHORTNESS OF BREATH: 0
WHEEZING: 0
EYE DISCHARGE: 0
BACK PAIN: 0
EYE REDNESS: 0
COUGH: 0
DIARRHEA: 0
CHEST TIGHTNESS: 0

## 2020-01-23 NOTE — ED PROVIDER NOTES
(APRESOLINE) 10 MG TABLET    Take 1 tablet by mouth 2 times daily as needed (90) Prn for bp >140 mm of hg    INCONTINENCE SUPPLY DISPOSABLE (ALWAYS FEMININE WIPES) MISC    USE AS NEEDED FOR INCONTINENCE    INCONTINENCE SUPPLY DISPOSABLE (FITTED BRIEFS MEDIUM) MISC    USE AS NEEDED FOR INCONTINENCE    LISINOPRIL (PRINIVIL;ZESTRIL) 20 MG TABLET    Take 1 tablet by mouth daily for 7 days    SPIRONOLACTONE (ALDACTONE) 25 MG TABLET    Take 1 tablet by mouth daily       ALLERGIES     Aldomet [methyldopa]    FAMILY HISTORY     History reviewed. No pertinent family history. SOCIAL HISTORY       Social History     Socioeconomic History    Marital status:       Spouse name: None    Number of children: None    Years of education: None    Highest education level: None   Occupational History    None   Social Needs    Financial resource strain: None    Food insecurity:     Worry: None     Inability: None    Transportation needs:     Medical: None     Non-medical: None   Tobacco Use    Smoking status: Never Smoker    Smokeless tobacco: Never Used   Substance and Sexual Activity    Alcohol use: No    Drug use: None    Sexual activity: None   Lifestyle    Physical activity:     Days per week: None     Minutes per session: None    Stress: None   Relationships    Social connections:     Talks on phone: None     Gets together: None     Attends Bahai service: None     Active member of club or organization: None     Attends meetings of clubs or organizations: None     Relationship status: None    Intimate partner violence:     Fear of current or ex partner: None     Emotionally abused: None     Physically abused: None     Forced sexual activity: None   Other Topics Concern    None   Social History Narrative    None       SCREENINGS      @FLOW([id number])@      PHYSICAL EXAM    (up to 7 for level 4, 8 or more for level 5)     ED Triage Vitals [01/23/20 1539]   BP Temp Temp src Pulse Resp SpO2 Height Weight

## 2020-01-24 ENCOUNTER — APPOINTMENT (OUTPATIENT)
Dept: GENERAL RADIOLOGY | Age: 85
DRG: 310 | End: 2020-01-24
Attending: FAMILY MEDICINE
Payer: MEDICARE

## 2020-01-24 ENCOUNTER — HOSPITAL ENCOUNTER (INPATIENT)
Age: 85
LOS: 4 days | Discharge: HOME | DRG: 310 | End: 2020-01-28
Attending: FAMILY MEDICINE | Admitting: FAMILY MEDICINE
Payer: MEDICARE

## 2020-01-24 ENCOUNTER — APPOINTMENT (OUTPATIENT)
Dept: VASCULAR LAB | Age: 85
DRG: 310 | End: 2020-01-24
Attending: FAMILY MEDICINE
Payer: MEDICARE

## 2020-01-24 ENCOUNTER — APPOINTMENT (OUTPATIENT)
Dept: NON INVASIVE DIAGNOSTICS | Age: 85
DRG: 310 | End: 2020-01-24
Attending: FAMILY MEDICINE
Payer: MEDICARE

## 2020-01-24 PROBLEM — I48.91 ATRIAL FIBRILLATION WITH RAPID VENTRICULAR RESPONSE (HCC): Status: ACTIVE | Noted: 2020-01-24

## 2020-01-24 LAB
ABSOLUTE EOS #: 0.06 K/UL (ref 0–0.44)
ABSOLUTE IMMATURE GRANULOCYTE: <0.03 K/UL (ref 0–0.3)
ABSOLUTE LYMPH #: 0.99 K/UL (ref 1.1–3.7)
ABSOLUTE MONO #: 0.59 K/UL (ref 0.1–1.2)
ALBUMIN SERPL-MCNC: 3.5 G/DL (ref 3.5–5.2)
ALBUMIN/GLOBULIN RATIO: 0.9 (ref 1–2.5)
ALP BLD-CCNC: 59 U/L (ref 35–104)
ALT SERPL-CCNC: 9 U/L (ref 5–33)
ANION GAP SERPL CALCULATED.3IONS-SCNC: 13 MMOL/L (ref 9–17)
AST SERPL-CCNC: 22 U/L
BASOPHILS # BLD: 1 % (ref 0–2)
BASOPHILS ABSOLUTE: 0.03 K/UL (ref 0–0.2)
BILIRUB SERPL-MCNC: 0.76 MG/DL (ref 0.3–1.2)
BNP INTERPRETATION: ABNORMAL
BUN BLDV-MCNC: 13 MG/DL (ref 8–23)
BUN/CREAT BLD: 13 (ref 9–20)
CALCIUM SERPL-MCNC: 9 MG/DL (ref 8.6–10.4)
CHLORIDE BLD-SCNC: 97 MMOL/L (ref 98–107)
CO2: 26 MMOL/L (ref 20–31)
CREAT SERPL-MCNC: 1.02 MG/DL (ref 0.5–0.9)
DIFFERENTIAL TYPE: ABNORMAL
EOSINOPHILS RELATIVE PERCENT: 1 % (ref 1–4)
GFR AFRICAN AMERICAN: >60 ML/MIN
GFR NON-AFRICAN AMERICAN: 51 ML/MIN
GFR SERPL CREATININE-BSD FRML MDRD: ABNORMAL ML/MIN/{1.73_M2}
GFR SERPL CREATININE-BSD FRML MDRD: ABNORMAL ML/MIN/{1.73_M2}
GLUCOSE BLD-MCNC: 146 MG/DL (ref 70–99)
HCT VFR BLD CALC: 34.2 % (ref 36.3–47.1)
HEMOGLOBIN: 11.2 G/DL (ref 11.9–15.1)
IMMATURE GRANULOCYTES: 0 %
LV EF: 53 %
LVEF MODALITY: NORMAL
LYMPHOCYTES # BLD: 16 % (ref 24–43)
MCH RBC QN AUTO: 31.9 PG (ref 25.2–33.5)
MCHC RBC AUTO-ENTMCNC: 32.7 G/DL (ref 28.4–34.8)
MCV RBC AUTO: 97.4 FL (ref 82.6–102.9)
MONOCYTES # BLD: 10 % (ref 3–12)
NRBC AUTOMATED: 0 PER 100 WBC
PDW BLD-RTO: 14.3 % (ref 11.8–14.4)
PLATELET # BLD: 187 K/UL (ref 138–453)
PLATELET ESTIMATE: ABNORMAL
PMV BLD AUTO: 12 FL (ref 8.1–13.5)
POTASSIUM SERPL-SCNC: 3.3 MMOL/L (ref 3.7–5.3)
PRO-BNP: 4661 PG/ML
RBC # BLD: 3.51 M/UL (ref 3.95–5.11)
RBC # BLD: ABNORMAL 10*6/UL
SEG NEUTROPHILS: 72 % (ref 36–65)
SEGMENTED NEUTROPHILS ABSOLUTE COUNT: 4.42 K/UL (ref 1.5–8.1)
SODIUM BLD-SCNC: 136 MMOL/L (ref 135–144)
THYROXINE, FREE: 1.48 NG/DL (ref 0.93–1.7)
TOTAL PROTEIN: 7.5 G/DL (ref 6.4–8.3)
TROPONIN INTERP: NORMAL
TROPONIN INTERP: NORMAL
TROPONIN T: <0.03 NG/ML
TROPONIN T: <0.03 NG/ML
TROPONIN, HIGH SENSITIVITY: NORMAL NG/L (ref 0–14)
TROPONIN, HIGH SENSITIVITY: NORMAL NG/L (ref 0–14)
TSH SERPL DL<=0.05 MIU/L-ACNC: 0.88 MIU/L (ref 0.3–5)
WBC # BLD: 6.1 K/UL (ref 3.5–11.3)
WBC # BLD: ABNORMAL 10*3/UL

## 2020-01-24 PROCEDURE — 85025 COMPLETE CBC W/AUTO DIFF WBC: CPT

## 2020-01-24 PROCEDURE — 83880 ASSAY OF NATRIURETIC PEPTIDE: CPT

## 2020-01-24 PROCEDURE — 80053 COMPREHEN METABOLIC PANEL: CPT

## 2020-01-24 PROCEDURE — 6370000000 HC RX 637 (ALT 250 FOR IP): Performed by: FAMILY MEDICINE

## 2020-01-24 PROCEDURE — 93005 ELECTROCARDIOGRAM TRACING: CPT | Performed by: FAMILY MEDICINE

## 2020-01-24 PROCEDURE — 2000000000 HC ICU R&B

## 2020-01-24 PROCEDURE — 2500000003 HC RX 250 WO HCPCS: Performed by: FAMILY MEDICINE

## 2020-01-24 PROCEDURE — 36415 COLL VENOUS BLD VENIPUNCTURE: CPT

## 2020-01-24 PROCEDURE — 71046 X-RAY EXAM CHEST 2 VIEWS: CPT

## 2020-01-24 PROCEDURE — 84443 ASSAY THYROID STIM HORMONE: CPT

## 2020-01-24 PROCEDURE — 93970 EXTREMITY STUDY: CPT

## 2020-01-24 PROCEDURE — 2580000003 HC RX 258: Performed by: FAMILY MEDICINE

## 2020-01-24 PROCEDURE — 84484 ASSAY OF TROPONIN QUANT: CPT

## 2020-01-24 PROCEDURE — 99223 1ST HOSP IP/OBS HIGH 75: CPT | Performed by: FAMILY MEDICINE

## 2020-01-24 PROCEDURE — 84439 ASSAY OF FREE THYROXINE: CPT

## 2020-01-24 PROCEDURE — 93306 TTE W/DOPPLER COMPLETE: CPT

## 2020-01-24 RX ORDER — LISINOPRIL 20 MG/1
20 TABLET ORAL DAILY
Status: DISCONTINUED | OUTPATIENT
Start: 2020-01-24 | End: 2020-01-28 | Stop reason: HOSPADM

## 2020-01-24 RX ORDER — ATORVASTATIN CALCIUM 20 MG/1
20 TABLET, FILM COATED ORAL NIGHTLY
Status: DISCONTINUED | OUTPATIENT
Start: 2020-01-24 | End: 2020-01-28 | Stop reason: HOSPADM

## 2020-01-24 RX ORDER — DILTIAZEM HYDROCHLORIDE 5 MG/ML
10 INJECTION INTRAVENOUS ONCE
Status: DISCONTINUED | OUTPATIENT
Start: 2020-01-24 | End: 2020-01-24

## 2020-01-24 RX ORDER — DILTIAZEM HYDROCHLORIDE 120 MG/1
120 CAPSULE, COATED, EXTENDED RELEASE ORAL DAILY
Status: DISCONTINUED | OUTPATIENT
Start: 2020-01-25 | End: 2020-01-27

## 2020-01-24 RX ORDER — SPIRONOLACTONE 25 MG/1
25 TABLET ORAL DAILY
Status: DISCONTINUED | OUTPATIENT
Start: 2020-01-25 | End: 2020-01-28 | Stop reason: HOSPADM

## 2020-01-24 RX ORDER — SODIUM CHLORIDE 0.9 % (FLUSH) 0.9 %
10 SYRINGE (ML) INJECTION EVERY 12 HOURS SCHEDULED
Status: DISCONTINUED | OUTPATIENT
Start: 2020-01-24 | End: 2020-01-28 | Stop reason: HOSPADM

## 2020-01-24 RX ORDER — ACETAMINOPHEN 500 MG
500 TABLET ORAL EVERY 4 HOURS PRN
Status: DISCONTINUED | OUTPATIENT
Start: 2020-01-24 | End: 2020-01-28 | Stop reason: HOSPADM

## 2020-01-24 RX ORDER — DOCUSATE SODIUM 100 MG/1
100 CAPSULE, LIQUID FILLED ORAL 2 TIMES DAILY PRN
Status: DISCONTINUED | OUTPATIENT
Start: 2020-01-24 | End: 2020-01-28 | Stop reason: HOSPADM

## 2020-01-24 RX ORDER — FLUOXETINE 10 MG/1
10 CAPSULE ORAL DAILY
Status: DISCONTINUED | OUTPATIENT
Start: 2020-01-25 | End: 2020-01-28 | Stop reason: HOSPADM

## 2020-01-24 RX ORDER — HYDRALAZINE HYDROCHLORIDE 10 MG/1
10 TABLET, FILM COATED ORAL 2 TIMES DAILY PRN
Status: DISCONTINUED | OUTPATIENT
Start: 2020-01-24 | End: 2020-01-28 | Stop reason: HOSPADM

## 2020-01-24 RX ORDER — DILTIAZEM HYDROCHLORIDE 120 MG/1
120 CAPSULE, COATED, EXTENDED RELEASE ORAL DAILY
Status: DISCONTINUED | OUTPATIENT
Start: 2020-01-24 | End: 2020-01-24

## 2020-01-24 RX ORDER — SODIUM CHLORIDE 0.9 % (FLUSH) 0.9 %
10 SYRINGE (ML) INJECTION PRN
Status: DISCONTINUED | OUTPATIENT
Start: 2020-01-24 | End: 2020-01-28 | Stop reason: HOSPADM

## 2020-01-24 RX ORDER — ONDANSETRON 2 MG/ML
4 INJECTION INTRAMUSCULAR; INTRAVENOUS EVERY 6 HOURS PRN
Status: DISCONTINUED | OUTPATIENT
Start: 2020-01-24 | End: 2020-01-28 | Stop reason: HOSPADM

## 2020-01-24 RX ADMIN — APIXABAN 5 MG: 5 TABLET, FILM COATED ORAL at 20:26

## 2020-01-24 RX ADMIN — DILTIAZEM HYDROCHLORIDE 5 MG/HR: 5 INJECTION INTRAVENOUS at 17:25

## 2020-01-24 RX ADMIN — ATORVASTATIN CALCIUM 20 MG: 20 TABLET, FILM COATED ORAL at 20:25

## 2020-01-24 ASSESSMENT — PAIN SCALES - GENERAL: PAINLEVEL_OUTOF10: 0

## 2020-01-24 NOTE — H&P
Review of Systems:  Constitutional:negative  for fevers, and negative for chills. Eyes: negative for visual disturbance   ENT: negative for sore throat, negative nasal congestion, and negative for earache  Respiratory: negative for shortness of breath, negative for cough, and negative for wheezing  Cardiovascular: negative for chest pain, negative for palpitations, and negative for syncope  Gastrointestinal: negative for abdominal pain, negative for nausea,negative for vomiting, negative for diarrhea, negative for constipation, and negative for hematochezia or melena  Genitourinary: negative for dysuria, negative for urinary urgency, negative for urinary frequency, and negative for hematuria  Skin: negative for skin rash, and negative for skin lesions. Positive for bruising to right leg. Neurological: negative for unilateral weakness, numbness or tingling. Physical Exam:    Vitals:   Temp: 98.1 °F (36.7 °C)  BP: (!) 140/96  Resp: 20  Pulse: 120  SpO2: 97 %  24HR INTAKE/OUTPUT:  No intake or output data in the 24 hours ending 01/24/20 1522    Exam:  GEN:  alert and oriented to person, place and time, well-developed and well nourished, in no acute distress and elderly female  EYES: No gross abnormalities. , PERRL and EOMI  NECK: normal, supple, no lymphadenopathy,  no carotid bruits  PULM: clear to auscultation bilaterally- no wheezes, rales or rhonchi, normal air movement, no respiratory distress  COR: no murmurs, no gallops, irregularly irregular, S1 normal, S2 normal and Atrial Fibrillation   ABD:  soft, non-tender, non-distended, normal bowel sounds, no masses or organomegaly  EXT:   1-2+ edema bilaterally. Cool to touch. Pale. Pedal pulses bilaterally with doppler.     NEURO: follows commands, SILVA, no deficits  SKIN:  Ecchymosis to right leg from mid-thigh extending below the knee  -----------------------------------------------------------------  Diagnostic Data:   Lab Results   Component Value Date Cardiology Consult  · High risk medication monitoring:Eliquis    CORE MEASURES  DVT prophylaxis: Lovenox  Decubitus ulcer present on admission: No  CODE STATUS: FULL CODE  Nutrition Status: good   Physical therapy: NA   Old Charts reviewed: Yes  EKG Reviewed:  Yes  Advance Directive Addressed: No    ALEYDA Juarez, NP-C  1/24/2020, 3:22 PM

## 2020-01-25 LAB
ABSOLUTE EOS #: 0.14 K/UL (ref 0–0.44)
ABSOLUTE IMMATURE GRANULOCYTE: <0.03 K/UL (ref 0–0.3)
ABSOLUTE LYMPH #: 1.2 K/UL (ref 1.1–3.7)
ABSOLUTE MONO #: 0.6 K/UL (ref 0.1–1.2)
ALBUMIN SERPL-MCNC: 3.1 G/DL (ref 3.5–5.2)
ALBUMIN/GLOBULIN RATIO: 0.9 (ref 1–2.5)
ALP BLD-CCNC: 50 U/L (ref 35–104)
ALT SERPL-CCNC: 9 U/L (ref 5–33)
ANION GAP SERPL CALCULATED.3IONS-SCNC: 11 MMOL/L (ref 9–17)
AST SERPL-CCNC: 17 U/L
BASOPHILS # BLD: 1 % (ref 0–2)
BASOPHILS ABSOLUTE: 0.05 K/UL (ref 0–0.2)
BILIRUB SERPL-MCNC: 0.93 MG/DL (ref 0.3–1.2)
BUN BLDV-MCNC: 12 MG/DL (ref 8–23)
BUN/CREAT BLD: 17 (ref 9–20)
CALCIUM SERPL-MCNC: 8.6 MG/DL (ref 8.6–10.4)
CHLORIDE BLD-SCNC: 98 MMOL/L (ref 98–107)
CO2: 26 MMOL/L (ref 20–31)
CREAT SERPL-MCNC: 0.69 MG/DL (ref 0.5–0.9)
DIFFERENTIAL TYPE: ABNORMAL
EKG ATRIAL RATE: 105 BPM
EKG Q-T INTERVAL: 310 MS
EKG QRS DURATION: 86 MS
EKG QTC CALCULATION (BAZETT): 483 MS
EKG R AXIS: -69 DEGREES
EKG T AXIS: 60 DEGREES
EKG VENTRICULAR RATE: 146 BPM
EOSINOPHILS RELATIVE PERCENT: 3 % (ref 1–4)
GFR AFRICAN AMERICAN: >60 ML/MIN
GFR NON-AFRICAN AMERICAN: >60 ML/MIN
GFR SERPL CREATININE-BSD FRML MDRD: ABNORMAL ML/MIN/{1.73_M2}
GFR SERPL CREATININE-BSD FRML MDRD: ABNORMAL ML/MIN/{1.73_M2}
GLUCOSE BLD-MCNC: 119 MG/DL (ref 70–99)
HCT VFR BLD CALC: 31.2 % (ref 36.3–47.1)
HEMOGLOBIN: 10.4 G/DL (ref 11.9–15.1)
IMMATURE GRANULOCYTES: 0 %
LYMPHOCYTES # BLD: 23 % (ref 24–43)
MAGNESIUM: 1.6 MG/DL (ref 1.6–2.6)
MCH RBC QN AUTO: 32.2 PG (ref 25.2–33.5)
MCHC RBC AUTO-ENTMCNC: 33.3 G/DL (ref 28.4–34.8)
MCV RBC AUTO: 96.6 FL (ref 82.6–102.9)
MONOCYTES # BLD: 11 % (ref 3–12)
NRBC AUTOMATED: 0 PER 100 WBC
PDW BLD-RTO: 14.1 % (ref 11.8–14.4)
PLATELET # BLD: 174 K/UL (ref 138–453)
PLATELET ESTIMATE: ABNORMAL
PMV BLD AUTO: 12 FL (ref 8.1–13.5)
POTASSIUM SERPL-SCNC: 3.3 MMOL/L (ref 3.7–5.3)
RBC # BLD: 3.23 M/UL (ref 3.95–5.11)
RBC # BLD: ABNORMAL 10*6/UL
SEG NEUTROPHILS: 62 % (ref 36–65)
SEGMENTED NEUTROPHILS ABSOLUTE COUNT: 3.27 K/UL (ref 1.5–8.1)
SODIUM BLD-SCNC: 135 MMOL/L (ref 135–144)
TOTAL PROTEIN: 6.7 G/DL (ref 6.4–8.3)
WBC # BLD: 5.3 K/UL (ref 3.5–11.3)
WBC # BLD: ABNORMAL 10*3/UL

## 2020-01-25 PROCEDURE — 6370000000 HC RX 637 (ALT 250 FOR IP): Performed by: FAMILY MEDICINE

## 2020-01-25 PROCEDURE — 93010 ELECTROCARDIOGRAM REPORT: CPT | Performed by: INTERNAL MEDICINE

## 2020-01-25 PROCEDURE — 85025 COMPLETE CBC W/AUTO DIFF WBC: CPT

## 2020-01-25 PROCEDURE — 80053 COMPREHEN METABOLIC PANEL: CPT

## 2020-01-25 PROCEDURE — 2500000003 HC RX 250 WO HCPCS: Performed by: FAMILY MEDICINE

## 2020-01-25 PROCEDURE — 6370000000 HC RX 637 (ALT 250 FOR IP): Performed by: INTERNAL MEDICINE

## 2020-01-25 PROCEDURE — 2580000003 HC RX 258: Performed by: FAMILY MEDICINE

## 2020-01-25 PROCEDURE — 83735 ASSAY OF MAGNESIUM: CPT

## 2020-01-25 PROCEDURE — 36415 COLL VENOUS BLD VENIPUNCTURE: CPT

## 2020-01-25 PROCEDURE — 2000000000 HC ICU R&B

## 2020-01-25 RX ADMIN — METOPROLOL TARTRATE 25 MG: 25 TABLET, FILM COATED ORAL at 20:17

## 2020-01-25 RX ADMIN — APIXABAN 5 MG: 5 TABLET, FILM COATED ORAL at 08:07

## 2020-01-25 RX ADMIN — SPIRONOLACTONE 25 MG: 25 TABLET, FILM COATED ORAL at 08:07

## 2020-01-25 RX ADMIN — LISINOPRIL 20 MG: 20 TABLET ORAL at 08:07

## 2020-01-25 RX ADMIN — DILTIAZEM HYDROCHLORIDE 5 MG/HR: 5 INJECTION INTRAVENOUS at 09:27

## 2020-01-25 RX ADMIN — DILTIAZEM HYDROCHLORIDE 120 MG: 120 CAPSULE, COATED, EXTENDED RELEASE ORAL at 07:42

## 2020-01-25 RX ADMIN — METOPROLOL TARTRATE 25 MG: 25 TABLET, FILM COATED ORAL at 09:04

## 2020-01-25 RX ADMIN — APIXABAN 5 MG: 5 TABLET, FILM COATED ORAL at 20:17

## 2020-01-25 RX ADMIN — FLUOXETINE 10 MG: 10 CAPSULE ORAL at 08:07

## 2020-01-25 RX ADMIN — ATORVASTATIN CALCIUM 20 MG: 20 TABLET, FILM COATED ORAL at 20:16

## 2020-01-25 RX ADMIN — Medication 10 ML: at 08:08

## 2020-01-25 NOTE — PROGRESS NOTES
Nutrition Assessment    Type and Reason for Visit: Initial, Positive Nutrition Screen(difficulty chewing)    Nutrition Recommendations:   1. Modify current diet, include cut meat into bite sized pieces. Nutrition Assessment: Chewing difficulty r/t altered GI aeb needs meat cut up into bite sized pieces (Hx stroke). Daughter states she uses low sodium options and tries to avoid salt in general. Makes things from scratch. Does not count carbs at home, A1c with good control. Pt daughter states Pt needs meats cut up and no tough meats due to stroke 1.5 months ago. Pt weight has trended up from 125 to 139#. No PO changes before admission. Malnutrition Assessment:  · Malnutrition Status: At risk for malnutrition  · Context: Acute illness or injury  · Findings of the 6 clinical characteristics of malnutrition (Minimum of 2 out of 6 clinical characteristics is required to make the diagnosis of moderate or severe Protein Calorie Malnutrition based on AND/ASPEN Guidelines):  1. Energy Intake-Greater than 75% of estimated energy requirement,      2. Weight Loss-2% loss or greater(2.2% (fluid losses expected cause)), (2 days)  3. Fat Loss-No significant subcutaneous fat loss,    4. Muscle Loss-No significant muscle mass loss,    5. Fluid Accumulation-Moderate to severe fluid accumulation, Extremities(+ 1-2, R/L LE pitting)  6.  Strength-Not measured    Nutrition Risk Level:  Moderate    Nutrition Diagnosis:   · Problem: Biting/chewing difficulty  · Etiology: related to Alteration in GI function     Signs and symptoms:  as evidenced by Patient report of(needs meat cut up into bite sized pieces)    Objective Information:  · Nutrition-Focused Physical Findings:    · Wound Type: None  · Current Nutrition Therapies:  · Oral Diet Orders: Carb Control 4 Carbs/Meal, 2gm Sodium   · Oral Diet intake: 51-75%  · Oral Nutrition Supplement (ONS) Orders: None  · Anthropometric Measures:  · Ht: 4' 11.84\" (152 cm)   · Current Body Wt: 135 lb 6.4 oz (61.4 kg)  · Admission Body Wt: 134 lb (60.8 kg)  · Usual Body Wt: 135 lb (61.2 kg)(130-139#)  · % Weight Change:  ,  2.2% weight loss x 3 days (on diuretics-fluid losses expected cause)  · Ideal Body Wt: 100 lb (45.4 kg), % Ideal Body 136%  · BMI Classification:    Lab Results   Component Value Date    LABA1C 5.7 06/28/2019   No results for input(s): POCGLU in the last 72 hours.    Lab Results   Component Value Date    TRIG 95 03/26/2019    HDL 65 03/26/2019     Recent Labs     01/23/20  1615 01/24/20  1530 01/25/20  0530   * 136 135   K 3.3* 3.3* 3.3*   CL 98 97* 98   CO2 22 26 26   BUN 13 13 12   CREATININE 0.79 1.02* 0.69   GLUCOSE 192* 146* 119*   ALT  --  9 9   ALKPHOS  --  59 50   GFR                                       Lab Results   Component Value Date    LABALBU 3.1 01/25/2020      Nutrition Interventions:   Modify current diet(add cut up meat)  Continued Inpatient Monitoring, Education declined, Coordination of Care    Nutrition Evaluation:   · Evaluation: Goals set   · Goals: PO > 75% of meals    · Monitoring: Meal Intake, I&O, Weight, Pertinent Labs, Chewing/Swallowing, Patient/Family Education      Electronically signed by Syed Anguiano RD, LD on 1/25/20 at 11:04 AM    Contact Number: [phone]

## 2020-01-25 NOTE — H&P
INCONTINENCE 10/1/19   William Zuniga MD       FAMILY HISTORY: family history is not on file. PHYSICAL EXAM:   BP (!) 144/100   Pulse 78   Temp 97.4 °F (36.3 °C) (Temporal)   Resp 14   Wt 134 lb (60.8 kg)   SpO2 97%   BMI 24.51 kg/m²  Body mass index is 24.51 kg/m². Constitutional: She is oriented to person, place, and time. She appears well-developed and well-nourished. In no acute distress. HEENT: Normocephalic and atraumatic. No JVD present. Carotid bruit is not present. No mass and no thyromegaly present. No lymphadenopathy present. Cardiovascular: Tachycardic rate, irregularly irregular rhythm, normal heart sounds. Exam reveals no gallop and no friction rubs. 2/6 systolic murmur, 4th intercostal space on the LEFT must lateral to the sternal border. Pulmonary/Chest: Effort normal and breath sounds normal. No respiratory distress. She has no wheezes, rhonchi or rales. Abdominal: Soft, non-tender. Bowel sounds and aorta are normal. She exhibits no organomegaly, mass or bruit. Extremities: Trace-1+ 1/2 up to the knees bilaterally. No cyanosis or clubbing. 2+ radial and carotid pulses. Distal extremity pulses: 1+ bilaterally. .   Neurological: She is alert and oriented to person, place, and time. No evidence of gross cranial nerve deficit. Coordination appeared normal.   Skin: Skin is warm and dry. There is no rash or diaphoresis. Psychiatric: She has a normal mood and affect.  Her speech is normal and behavior is normal.      MOST RECENT LABS ON RECORD:   Lab Results   Component Value Date    WBC 6.1 01/24/2020    HGB 11.2 (L) 01/24/2020    HCT 34.2 (L) 01/24/2020     01/24/2020    CHOL 182 03/26/2019    TRIG 95 03/26/2019    HDL 65 03/26/2019    ALT 9 01/24/2020    AST 22 01/24/2020     01/24/2020    K 3.3 (L) 01/24/2020    CL 97 (L) 01/24/2020    CREATININE 1.02 (H) 01/24/2020    BUN 13 01/24/2020    CO2 26 01/24/2020    TSH 0.88 01/24/2020    INR 1.0 11/23/2019    LABA1C 5.7 06/28/2019       ASSESSMENT:  Patient Active Problem List    Diagnosis Date Noted    Atrial fibrillation with rapid ventricular response ([facility] 01/24/2020    Ischemic stroke ([facility]     Paroxysmal atrial fibrillation ([facility] 08/02/2017    Hyperlipidemia 05/03/2016    Right shoulder pain 05/03/2016    Recurrent major depressive disorder, [facility] partial [facility]     Diabetes mellitus without complication ([facility]     Osteoarthritis     Essential hypertension         PLAN:  · Paroxysmal Atrial Fibrillation: Rate Control Symptomatic. Unknown etiology. Recommend ruling out alternative etiologies such as sepsis, anemia, PE etc.   Beta Blocker: Not indicated at this time.  Calcium Channel Blocker: START diltiazem (Cardizem) by IV drip I also discussed the potential side effects of this medication including lightheadedness and dizziness and instructed them to stop the medication of this occurs and call our office if this occurs. Likely convert over to oral Cardizem in AM with titrate up as needed.  Anti-Arrhythmic: Not indicated.  Stroke Risk: CHADS2-VASc Score: 7/9 (9.6% stroke risk)  o [id number] Score for Atrial Fibrillation Stroke Risk  o  o Risk   o Factors o  o Component Value   o C o CHF o No o 0   o H o HTN o Yes o 1   o A2 o Age >= 76 o Yes,  o (80 y.o.) o 2   o D o DM o Yes o 1   o S2 o Prior Stroke/TIA o Yes o 2   o V o Vascular Disease o No o 0   o A o Age 74-69 o No,  o (80 y.o.) o 0   o Sc o Sex o female o 1   o  o [id number]  o Score o  o 7   o Score last updated 1/24/20 11:63 PM  o   o Click here for a link to the UpToDate guideline \"Atrial Fibrillation: Anticoagulation therapy to prevent embolization  o Disclaimer: Risk Score calculation is dependent on accuracy of patient problem list and past encounter diagnosis.  Anticoagulation: Continue Apixaban (Eliquis) 5 mg every 12 hours.    Additional Testing List: I will review her echocardiogram to better assess for the etiology of this problem

## 2020-01-25 NOTE — PROGRESS NOTES
Deon Espinosa M.D. ICU Progress Note 20    SUBJECTIVE:    Pt seen and examined in the ICU for follow up of Atrial fibrillation with rapid ventricular response ([address] She was weaned off the diltiazem drip early this morning but HR's jumped up into the 140's again. She was given her PO diltiazem early at 0745 but HR continued to run 140's - 160's. She was started back on the diltiazem drip at 0845. BP's running 130's/100's. She complains of palpitations / fluttering, but no chest pain or SOB. Denies any abd pain, nausea, vomiting, diarrhea or constipation. No other symptoms other than the palpitations. ROS:   Constitutional: negative  for fevers, and negative for chills. Respiratory: negative for shortness of breath, negative for cough, and negative for wheezing  Cardiovascular: negative for chest pain, and positive for palpitations  Gastrointestinal: negative for abdominal pain, negative for nausea,negative for vomiting, negative for diarrhea, and negative for constipation    All other systems were reviewed with the patient and are negative unless otherwise stated in HPI. OBJECTIVE:    Vitals:   Temp: 97.9 °F (36.6 °C)  Temp range:    Temp  Av.8 °F (36.6 °C)  Min: 97.4 °F (36.3 °C)  Max: 98.1 °F (36.7 °C)    BP: (!) 138/108  BP Range:      Systolic (75BCB), BRN:389 , Min:101 , AVF:710      Diastolic (67WUD), FBQ:57, Min:70, Max:143    Pulse: 161  Pulse Range:    Pulse  Av.4  Min: 77  Max: 161    Resp: 21  Resp Range:   Resp  Av.5  Min: 10  Max: 28    SpO2: 96 % on room air  SpO2 range:   SpO2  Av.4 %  Min: 90 %  Max: 98 %    24HR INTAKE/OUTPUT:      Intake/Output Summary (Last 24 hours) at 2020 0888  Last data filed at 2020 0500  Gross per 24 hour   Intake 575 ml   Output --   Net 575 ml     -----------------------------------------------------------------  Exam:  GEN:    Awake, alert and oriented x3. EYES:  EOMI, pupils equal   NECK: Supple.  No

## 2020-01-25 NOTE — PROGRESS NOTES
Notified nursing supervisor of morning Johanna Virk to inform also. Message left at this time. Will await return call.

## 2020-01-25 NOTE — PROGRESS NOTES
Monitor shows atrial fibrillation in 70's with pauses noted. Cardizem off. IV lock flushed. No complaints voiced. Remains quiet in bed watching TV.

## 2020-01-25 NOTE — PLAN OF CARE
Problem: Falls - Risk of:  Goal: Will remain free from falls  Description  Will remain free from falls  Outcome: Ongoing  Note:   Fall assessment completed daily. Bed in lowest position. Call light within reach. Falling star posted to outside of door. Fall risk sticker in place on ID band. Side rails up 2/4. Bed alarm on for safety. Patient ambulates fairly well per report. One assist utilized. Will continue to monitor. Goal: Absence of physical injury  Description  Absence of physical injury  Outcome: Ongoing     Problem: Activity:  Goal: Ability to tolerate increased activity will improve  Description  Ability to tolerate increased activity will improve  Outcome: Ongoing  Goal: Expression of feelings of increased energy will increase  Description  Expression of feelings of increased energy will increase  Outcome: Ongoing     Problem: Cardiac:  Goal: Ability to maintain an adequate cardiac output will improve  Description  Ability to maintain an adequate cardiac output will improve  Outcome: Ongoing  Note:   Vitals:    01/25/20 0800   BP: (!) 138/108   Pulse: 161   Resp: 21   Temp:    SpO2:      Started on PO Cardizem.    Goal: Complications related to the disease process, condition or treatment will be avoided or minimized  Description  Complications related to the disease process, condition or treatment will be avoided or minimized  Outcome: Ongoing     Problem: Coping:  Goal: Level of anxiety will decrease  Description  Level of anxiety will decrease  Outcome: Ongoing  Goal: General experience of comfort will improve  Description  General experience of comfort will improve  Outcome: Ongoing     Problem: Safety:  Goal: Ability to remain free from injury will improve  Description  Ability to remain free from injury will improve  Outcome: Ongoing  Goal: Will show no signs and symptoms of excessive bleeding  Description  Will show no signs and symptoms of excessive bleeding  Outcome: Ongoing     Problem:

## 2020-01-26 LAB
ABSOLUTE EOS #: 0.14 K/UL (ref 0–0.44)
ABSOLUTE IMMATURE GRANULOCYTE: <0.03 K/UL (ref 0–0.3)
ABSOLUTE LYMPH #: 1.2 K/UL (ref 1.1–3.7)
ABSOLUTE MONO #: 0.65 K/UL (ref 0.1–1.2)
ALBUMIN SERPL-MCNC: 3.1 G/DL (ref 3.5–5.2)
ALBUMIN/GLOBULIN RATIO: 0.9 (ref 1–2.5)
ALP BLD-CCNC: 48 U/L (ref 35–104)
ALT SERPL-CCNC: 9 U/L (ref 5–33)
ANION GAP SERPL CALCULATED.3IONS-SCNC: 9 MMOL/L (ref 9–17)
AST SERPL-CCNC: 18 U/L
BASOPHILS # BLD: 1 % (ref 0–2)
BASOPHILS ABSOLUTE: 0.05 K/UL (ref 0–0.2)
BILIRUB SERPL-MCNC: 0.94 MG/DL (ref 0.3–1.2)
BUN BLDV-MCNC: 11 MG/DL (ref 8–23)
BUN/CREAT BLD: 15 (ref 9–20)
CALCIUM SERPL-MCNC: 8.4 MG/DL (ref 8.6–10.4)
CHLORIDE BLD-SCNC: 100 MMOL/L (ref 98–107)
CO2: 27 MMOL/L (ref 20–31)
CREAT SERPL-MCNC: 0.73 MG/DL (ref 0.5–0.9)
DIFFERENTIAL TYPE: ABNORMAL
EOSINOPHILS RELATIVE PERCENT: 2 % (ref 1–4)
GFR AFRICAN AMERICAN: >60 ML/MIN
GFR NON-AFRICAN AMERICAN: >60 ML/MIN
GFR SERPL CREATININE-BSD FRML MDRD: ABNORMAL ML/MIN/{1.73_M2}
GFR SERPL CREATININE-BSD FRML MDRD: ABNORMAL ML/MIN/{1.73_M2}
GLUCOSE BLD-MCNC: 122 MG/DL (ref 70–99)
HCT VFR BLD CALC: 31.5 % (ref 36.3–47.1)
HEMOGLOBIN: 10.5 G/DL (ref 11.9–15.1)
IMMATURE GRANULOCYTES: 0 %
LYMPHOCYTES # BLD: 21 % (ref 24–43)
MCH RBC QN AUTO: 32.5 PG (ref 25.2–33.5)
MCHC RBC AUTO-ENTMCNC: 33.3 G/DL (ref 28.4–34.8)
MCV RBC AUTO: 97.5 FL (ref 82.6–102.9)
MONOCYTES # BLD: 11 % (ref 3–12)
NRBC AUTOMATED: 0 PER 100 WBC
PDW BLD-RTO: 14 % (ref 11.8–14.4)
PLATELET # BLD: 173 K/UL (ref 138–453)
PLATELET ESTIMATE: ABNORMAL
PMV BLD AUTO: 12.5 FL (ref 8.1–13.5)
POTASSIUM SERPL-SCNC: 4 MMOL/L (ref 3.7–5.3)
RBC # BLD: 3.23 M/UL (ref 3.95–5.11)
RBC # BLD: ABNORMAL 10*6/UL
SEG NEUTROPHILS: 65 % (ref 36–65)
SEGMENTED NEUTROPHILS ABSOLUTE COUNT: 3.74 K/UL (ref 1.5–8.1)
SODIUM BLD-SCNC: 136 MMOL/L (ref 135–144)
TOTAL PROTEIN: 6.6 G/DL (ref 6.4–8.3)
WBC # BLD: 5.8 K/UL (ref 3.5–11.3)
WBC # BLD: ABNORMAL 10*3/UL

## 2020-01-26 PROCEDURE — 80053 COMPREHEN METABOLIC PANEL: CPT

## 2020-01-26 PROCEDURE — 6370000000 HC RX 637 (ALT 250 FOR IP): Performed by: INTERNAL MEDICINE

## 2020-01-26 PROCEDURE — 6370000000 HC RX 637 (ALT 250 FOR IP): Performed by: FAMILY MEDICINE

## 2020-01-26 PROCEDURE — 85025 COMPLETE CBC W/AUTO DIFF WBC: CPT

## 2020-01-26 PROCEDURE — 2000000000 HC ICU R&B

## 2020-01-26 PROCEDURE — 2580000003 HC RX 258: Performed by: FAMILY MEDICINE

## 2020-01-26 PROCEDURE — 36415 COLL VENOUS BLD VENIPUNCTURE: CPT

## 2020-01-26 RX ADMIN — HYDRALAZINE HYDROCHLORIDE 10 MG: 10 TABLET, FILM COATED ORAL at 07:42

## 2020-01-26 RX ADMIN — FLUOXETINE 10 MG: 10 CAPSULE ORAL at 08:51

## 2020-01-26 RX ADMIN — ATORVASTATIN CALCIUM 20 MG: 20 TABLET, FILM COATED ORAL at 20:52

## 2020-01-26 RX ADMIN — DILTIAZEM HYDROCHLORIDE 120 MG: 120 CAPSULE, COATED, EXTENDED RELEASE ORAL at 08:51

## 2020-01-26 RX ADMIN — METOPROLOL TARTRATE 25 MG: 25 TABLET, FILM COATED ORAL at 20:52

## 2020-01-26 RX ADMIN — SPIRONOLACTONE 25 MG: 25 TABLET, FILM COATED ORAL at 08:51

## 2020-01-26 RX ADMIN — APIXABAN 5 MG: 5 TABLET, FILM COATED ORAL at 20:52

## 2020-01-26 RX ADMIN — LISINOPRIL 20 MG: 20 TABLET ORAL at 08:51

## 2020-01-26 RX ADMIN — APIXABAN 5 MG: 5 TABLET, FILM COATED ORAL at 08:51

## 2020-01-26 RX ADMIN — METOPROLOL TARTRATE 25 MG: 25 TABLET, FILM COATED ORAL at 08:51

## 2020-01-26 RX ADMIN — Medication 10 ML: at 20:52

## 2020-01-26 RX ADMIN — Medication 10 ML: at 08:52

## 2020-01-26 ASSESSMENT — PAIN SCALES - GENERAL
PAINLEVEL_OUTOF10: 0
PAINLEVEL_OUTOF10: 0

## 2020-01-26 NOTE — PROGRESS NOTES
Quiet with eyes closed. Monitors reviewed. Multiple pauses of 1.5 - 1.7 seconds noted. Monitor shows atrial fibrillation 80's to 90's.

## 2020-01-26 NOTE — PLAN OF CARE
excessive bleeding  Description  Will show no signs and symptoms of excessive bleeding  Outcome: Ongoing     Problem: SAFETY  Goal: Free from accidental physical injury  Outcome: Ongoing  Note:   ID band correct and in place. Bed locked and in lowest position. Call light within reach. Patient free from injury. Will continue to monitor. Goal: Free from intentional harm  Outcome: Ongoing     Problem: DAILY CARE  Goal: Daily care needs are met  Outcome: Ongoing  Note:   Daily cares assessed and needs met according to patient condition. Patient reminded to ask for help when needed. Problem: PAIN  Goal: Patient's pain/discomfort is manageable  Outcome: Ongoing  Note:   Pain assessed every 4 hours. Patient is able to communicate with staff the need for pain interventions. Patient currently denies pain. Will continue to monitor. Problem: SKIN INTEGRITY  Goal: Skin integrity is maintained or improved  Outcome: Ongoing     Problem: KNOWLEDGE DEFICIT  Goal: Patient/S.O. demonstrates understanding of disease process, treatment plan, medications, and discharge instructions. Outcome: Ongoing     Problem: DISCHARGE BARRIERS  Goal: Patient's continuum of care needs are met  Outcome: Ongoing     Problem: Risk for Impaired Skin Integrity  Goal: Tissue integrity - skin and mucous membranes  Description  Structural intactness and normal physiological function of skin and  mucous membranes.   Outcome: Ongoing     Problem: Nutrition  Goal: Optimal nutrition therapy  Description  Nutrition Problem: Biting/chewing difficulty  Intervention: Food and/or Nutrient Delivery: Modify current diet(add cut up meat)  Nutritional Goals: PO > 75% of meals     Outcome: Ongoing

## 2020-01-26 NOTE — PROGRESS NOTES
Eula Langston M.D. ICU Progress Note 20    SUBJECTIVE:    Pt seen and examined in the ICU for follow up of Atrial fibrillation with rapid ventricular response ([address] HR's have been ranging from 70's up to 110's for the most part. She denies any chest pain but does feel palpitations at times when her HR jumps up. Denies any SOB. Denies GI or  symptoms. ROS:   Constitutional: negative  for fevers, and negative for chills. Respiratory: negative for shortness of breath, negative for cough, and negative for wheezing  Cardiovascular: negative for chest pain, and positive for palpitations  Gastrointestinal: negative for abdominal pain, negative for nausea,negative for vomiting, negative for diarrhea, and negative for constipation    All other systems were reviewed with the patient and are negative unless otherwise stated in HPI. OBJECTIVE:    Vitals:   Temp: 98 °F (36.7 °C)  Temp range:    Temp  Av.9 °F (36.6 °C)  Min: 97.4 °F (36.3 °C)  Max: 98.3 °F (36.8 °C)    BP: (!) 126/99  BP Range:      Systolic (20QGF), LQN:047 , Min:103 , HGE:491      Diastolic (72PDY), ZWY:228, Min:73, Max:136    Pulse: 98  Pulse Range:    Pulse  Av.4  Min: 62  Max: 134    Resp: 22  Resp Range:   Resp  Avg: 15.8  Min: 11  Max: 24    SpO2: 95 % on room air  SpO2 range:   SpO2  Av.5 %  Min: 95 %  Max: 98 %    24HR INTAKE/OUTPUT:      Intake/Output Summary (Last 24 hours) at 2020 1135  Last data filed at 2020 0930  Gross per 24 hour   Intake 623 ml   Output 450 ml   Net 173 ml     -----------------------------------------------------------------  Exam:  GEN:    Awake, alert and oriented x3. EYES:  EOMI, pupils equal   NECK: Supple. No lymphadenopathy. No carotid bruit  CVS:    irregularly irregular, 2/6 systolic murmur  PULM:  CTA, no wheezes, rales or rhonchi, no acute respiratory distress  ABD:    Bowels sounds normal.  Abdomen is soft. No distention. no tenderness to palpation.    EXT:   no

## 2020-01-27 LAB
ABSOLUTE EOS #: 0.19 K/UL (ref 0–0.44)
ABSOLUTE IMMATURE GRANULOCYTE: <0.03 K/UL (ref 0–0.3)
ABSOLUTE LYMPH #: 1.13 K/UL (ref 1.1–3.7)
ABSOLUTE MONO #: 0.6 K/UL (ref 0.1–1.2)
ALBUMIN SERPL-MCNC: 3 G/DL (ref 3.5–5.2)
ALBUMIN/GLOBULIN RATIO: 0.8 (ref 1–2.5)
ALP BLD-CCNC: 51 U/L (ref 35–104)
ALT SERPL-CCNC: 8 U/L (ref 5–33)
ANION GAP SERPL CALCULATED.3IONS-SCNC: 10 MMOL/L (ref 9–17)
AST SERPL-CCNC: 16 U/L
BASOPHILS # BLD: 1 % (ref 0–2)
BASOPHILS ABSOLUTE: 0.04 K/UL (ref 0–0.2)
BILIRUB SERPL-MCNC: 0.95 MG/DL (ref 0.3–1.2)
BUN BLDV-MCNC: 15 MG/DL (ref 8–23)
BUN/CREAT BLD: 19 (ref 9–20)
CALCIUM SERPL-MCNC: 8.7 MG/DL (ref 8.6–10.4)
CHLORIDE BLD-SCNC: 99 MMOL/L (ref 98–107)
CO2: 25 MMOL/L (ref 20–31)
CREAT SERPL-MCNC: 0.8 MG/DL (ref 0.5–0.9)
DIFFERENTIAL TYPE: ABNORMAL
EOSINOPHILS RELATIVE PERCENT: 3 % (ref 1–4)
GFR AFRICAN AMERICAN: >60 ML/MIN
GFR NON-AFRICAN AMERICAN: >60 ML/MIN
GFR SERPL CREATININE-BSD FRML MDRD: ABNORMAL ML/MIN/{1.73_M2}
GFR SERPL CREATININE-BSD FRML MDRD: ABNORMAL ML/MIN/{1.73_M2}
GLUCOSE BLD-MCNC: 138 MG/DL (ref 70–99)
HCT VFR BLD CALC: 34.9 % (ref 36.3–47.1)
HEMOGLOBIN: 11.6 G/DL (ref 11.9–15.1)
IMMATURE GRANULOCYTES: 0 %
LYMPHOCYTES # BLD: 20 % (ref 24–43)
MCH RBC QN AUTO: 32.5 PG (ref 25.2–33.5)
MCHC RBC AUTO-ENTMCNC: 33.2 G/DL (ref 28.4–34.8)
MCV RBC AUTO: 97.8 FL (ref 82.6–102.9)
MONOCYTES # BLD: 11 % (ref 3–12)
NRBC AUTOMATED: 0 PER 100 WBC
PDW BLD-RTO: 14 % (ref 11.8–14.4)
PLATELET # BLD: 180 K/UL (ref 138–453)
PLATELET ESTIMATE: ABNORMAL
PMV BLD AUTO: 11.3 FL (ref 8.1–13.5)
POTASSIUM SERPL-SCNC: 3.8 MMOL/L (ref 3.7–5.3)
RBC # BLD: 3.57 M/UL (ref 3.95–5.11)
RBC # BLD: ABNORMAL 10*6/UL
SEG NEUTROPHILS: 65 % (ref 36–65)
SEGMENTED NEUTROPHILS ABSOLUTE COUNT: 3.7 K/UL (ref 1.5–8.1)
SODIUM BLD-SCNC: 134 MMOL/L (ref 135–144)
TOTAL PROTEIN: 6.9 G/DL (ref 6.4–8.3)
WBC # BLD: 5.7 K/UL (ref 3.5–11.3)
WBC # BLD: ABNORMAL 10*3/UL

## 2020-01-27 PROCEDURE — 6370000000 HC RX 637 (ALT 250 FOR IP): Performed by: FAMILY MEDICINE

## 2020-01-27 PROCEDURE — 2580000003 HC RX 258: Performed by: FAMILY MEDICINE

## 2020-01-27 PROCEDURE — 97530 THERAPEUTIC ACTIVITIES: CPT

## 2020-01-27 PROCEDURE — 99232 SBSQ HOSP IP/OBS MODERATE 35: CPT | Performed by: FAMILY MEDICINE

## 2020-01-27 PROCEDURE — 1200000000 HC SEMI PRIVATE

## 2020-01-27 PROCEDURE — 97163 PT EVAL HIGH COMPLEX 45 MIN: CPT

## 2020-01-27 PROCEDURE — 36415 COLL VENOUS BLD VENIPUNCTURE: CPT

## 2020-01-27 PROCEDURE — 85025 COMPLETE CBC W/AUTO DIFF WBC: CPT

## 2020-01-27 PROCEDURE — 80053 COMPREHEN METABOLIC PANEL: CPT

## 2020-01-27 PROCEDURE — 97162 PT EVAL MOD COMPLEX 30 MIN: CPT

## 2020-01-27 RX ORDER — DILTIAZEM HYDROCHLORIDE 240 MG/1
240 CAPSULE, COATED, EXTENDED RELEASE ORAL DAILY
Status: DISCONTINUED | OUTPATIENT
Start: 2020-01-27 | End: 2020-01-28 | Stop reason: HOSPADM

## 2020-01-27 RX ADMIN — SPIRONOLACTONE 25 MG: 25 TABLET, FILM COATED ORAL at 09:03

## 2020-01-27 RX ADMIN — MAGNESIUM HYDROXIDE 30 ML: 400 SUSPENSION ORAL at 12:46

## 2020-01-27 RX ADMIN — METOPROLOL TARTRATE 25 MG: 25 TABLET, FILM COATED ORAL at 09:03

## 2020-01-27 RX ADMIN — LISINOPRIL 20 MG: 20 TABLET ORAL at 09:03

## 2020-01-27 RX ADMIN — APIXABAN 5 MG: 5 TABLET, FILM COATED ORAL at 09:03

## 2020-01-27 RX ADMIN — ATORVASTATIN CALCIUM 20 MG: 20 TABLET, FILM COATED ORAL at 20:03

## 2020-01-27 RX ADMIN — DILTIAZEM HYDROCHLORIDE 240 MG: 240 CAPSULE, COATED, EXTENDED RELEASE ORAL at 09:03

## 2020-01-27 RX ADMIN — FLUOXETINE 10 MG: 10 CAPSULE ORAL at 09:03

## 2020-01-27 RX ADMIN — Medication 10 ML: at 20:03

## 2020-01-27 RX ADMIN — APIXABAN 5 MG: 5 TABLET, FILM COATED ORAL at 20:03

## 2020-01-27 RX ADMIN — Medication 10 ML: at 09:04

## 2020-01-27 RX ADMIN — METOPROLOL TARTRATE 25 MG: 25 TABLET, FILM COATED ORAL at 20:03

## 2020-01-27 ASSESSMENT — PAIN SCALES - GENERAL
PAINLEVEL_OUTOF10: 0

## 2020-01-27 NOTE — PROGRESS NOTES
Physical Therapy    Facility/Department: [facility] MED SURG  Initial Assessment    NAME: Ely Kelley  : 1933  MRN: [id number]    Date of Service: 2020    Discharge Recommendations:  Continue to assess pending progress, [facility] [address] [facility], 24 hour supervision or assist, Patient would benefit from continued therapy after discharge        Assessment   Body structures, Functions, Activity limitations: Decreased functional mobility ; Decreased high-level IADLs;Decreased ADL status; Decreased endurance;Decreased strength;Decreased balance;Decreased safe awareness  Assessment: Pt is a 79 y/o female admitted for A-fib. Pt is pleasantly confused at time of evaluation, therefore obtained medical hx from RN and EMR. Pt demonstrates BLE weakness, making transfers, gait and overall functional mobility difficult. 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 Pt performs bed mob with CGA and transfers with Boy x2, with verbal and tactile cues required for proper sequencing and safety. Pt ambulates 3ft with RW and CGA, with VC required for proper use of AD and to redirect pt along proper path. Pt denies SOB and chest pain throughout evaluation. Pt will benefit from skilled PT services to improve   Treatment Diagnosis: generalized weakness  Prognosis: Good  Decision Making: Medium Complexity  PT Education: Plan of Care;Transfer Training;General Safety;Gait Training; Adaptive Device Training  REQUIRES PT FOLLOW UP: Yes  Activity Tolerance  Activity Tolerance: Patient Tolerated treatment well       Patient Diagnosis(es): There were no encounter diagnoses.      has a past medical history of Atrial fibrillation ([address] Dementia ([address] Depressive disorder, not elsewhere classified, Osteoarthrosis, unspecified whether generalized or localized, unspecified site, Pure hypercholesterolemia, Type II or unspecified type diabetes mellitus without mention of Walker  Assistance: Contact guard assistance  Gait Deviations: Staggers; Deviated path  Distance: 3ft  Comments: Pt requires VC for proper use of AD and to redirect pt along appropriate path. Stairs/Curb  Stairs?: No     Balance  Posture: Fair  Sitting - Static: Good;-  Sitting - Dynamic: Fair;+  Standing - Static: Poor  Standing - Dynamic: Poor        Plan   Plan  Times per week: 7 days per week  Times per day: Twice a day(Daily on weekends)  Current Treatment Recommendations: Strengthening, ADL/Self-care Training, Gait Training, Patient/Caregiver Education & Training, ROM, IADL Training, Stair training, Balance Training, Neuromuscular Re-education, Functional Mobility Training, Endurance Training, Home Exercise Program, Safety Education & Training, Transfer Training  Safety Devices  Type of devices: Chair alarm in place, All fall risk precautions in place                                                  AM-PAC Score     AM-PAC Inpatient Mobility without Stair Climbing Raw Score : 12 (01/27/20 [facility]6)  AM-PAC Inpatient without Stair Climbing T-Scale Score : 37.26 (01/27/20 [facility])  Mobility Inpatient CMS 0-100% Score: 63.03 (01/27/20 [facility]6)  Mobility Inpatient without Stair CMS G-Code Modifier : CL (01/27/20 1356)       Goals  Short term goals  Time Frame for Short term goals: 10 days  Short term goal 1: Pt will perform transfers with CGA x1 to decrease level of caregiver assist.   Short term goal 2: Pt will ambulate 50ft with RW and CGA for ease of functional ambulation. Short term goal 3: Pt will tolerate 20-30mins ther ex/act to improve endurance for ADLs and functional tasks.         Therapy Time   Individual Concurrent Group Co-treatment   Time In 53         Time Out 1325         Minutes 0669 Paramjit Wall DPT

## 2020-01-27 NOTE — CARE COORDINATION
Explained patients right to have family, representative or physician notified of their admission. Patient has Declined for physician to be notified. Patient has Declined for family/representative to be notified. Dr Efe Louie is PCP and Attending,  Family in room during conversation.    Bob Novoa RN

## 2020-01-27 NOTE — PROGRESS NOTES
11%    Notes:   Isrrael Rios is sitting in the chair during my visit. Her daughter Mirta Scott is present. Isrrael Rios was just transferred to [facility] from ICU. She has atrial fib with RVR. Hx of CVA recently. Daughter states that she has home health including physical therapy and speech therapy. Pt is alert and oriented but daughter states \"I think she has dementia. She repeats things over and over\". Isrrael Rios has some difficulty with her speech. Daughter does most of the talking. States that she ate breakfast this morning without any difficulty. Ate scrambled eggs and ground turkey. Only needs her meat cut very small. Encouragement given. Pt denies any problems with her bowels. Daughter lives next door and cooks her meals, etc.  Has been staying with her lately. Has a walker but daughter states \"She carries it instead of using it\". Daughter states went to PCP office due to bruising of her right leg. Pt then was admitted to the hospital.  Is on Eliquis. Bruising has improved. Daughter asks questions about cardioversion, which is answered. Support given. ACP discussion. Daughter had brought copy of advance directives, which is noted in her paper chart. Daughter inquires about DNR. States was asked about it when she was admitted but \"no one came back to finish with it\". DNR discussion. Pt is in agreement to a DNRCC-A order. Paperwork signed by daughter as she states pt cannot sign. Faxed to PCP office. Denies any other questions or concerns at this time. Will continue to follow and support. Plan is to return home with home health resuming at discharge.       Palliative Care Plan:  Education/support to family  Education/support to patient  Providing support for coping/adaptation/distress of family  Providing support for coping/adaptation/distress of patient  Specific spiritual beliefs/practices  Continue with current plan of care  Palliative Care Goals:  improve or maintain function/quality of life,

## 2020-01-27 NOTE — PROGRESS NOTES
Progress Note  Steve Vora MD    SUBJECTIVE: Patient is seen for Principal Problem:    Atrial fibrillation with rapid ventricular response (HCC)  Active Problems:    Recurrent major depressive disorder, in partial remission ([facility]    Diabetes mellitus without complication ([facility]    Essential hypertension    Hyperlipidemia    Ischemic stroke ([facility]  Resolved Problems:    * No resolved hospital problems. *     pt in afib,rate variable    OBJECTIVE:    Vitals:   Vitals:    01/27/20 0700   BP: (!) 144/115   Pulse: 112   Resp: 18   Temp: 98.5 °F (36.9 °C)   SpO2: 97%     Weight: 134 lb 12.8 oz (61.1 kg)   Height: 4' 11.84\" (152 cm)   -----------------------------------------------------------------  Exam:    CONSTITUTIONAL:  awake, alert, cooperative, no apparent distress, and appears stated age  EYES:  Lids and lashes normal, pupils equal, round and reactive to light, extra ocular muscles intact, sclera clear, conjunctiva normal  ENT:  Normocephalic, without obvious abnormality, atraumatic, sinuses nontender on palpation, external ears without lesions, oral pharynx with moist mucus membranes, tonsils without erythema or exudates, gums normal and good dentition. NECK:  Supple, symmetrical, trachea midline, no adenopathy, thyroid symmetric, not enlarged and no tenderness, skin normal  HEMATOLOGIC/LYMPHATICS:  no cervical lymphadenopathy  LUNGS:  No increased work of breathing, good air exchange, clear to auscultation bilaterally, no crackles or wheezing  CARDIOVASCULAR:  irrebularlky iregular,rate in 110's,no gallop  ABDOMEN:  No scars, normal bowel sounds, soft, non-distended, non-tender, no masses palpated, no hepatosplenomegally    MUSCULOSKELETAL:  There is no redness, warmth, or swelling of the joints. Full range of motion noted. Motor strength is 5 out of 5 all extremities bilaterally. Tone is normal.  NEUROLOGIC:  Awake, alert, oriented to name, place and time. Cranial nerves II-XII are grossly intact. Motor is 5 out of 5 bilaterally. Cerebellar finger to nose, heel to shin intact. Sensory is intact.   Babinski down going, Romberg negative, and gait is normal.  SKIN:  no bruising or bleeding  EXT:     no cyanosis, clubbing or edema present    -----------------------------------------------------------------  Diagnostic Data: Reviewed    More Recent Labs:    Results for orders placed or performed during the hospital encounter of 01/24/20   TSH without Reflex   Result Value Ref Range    TSH 0.88 0.30 - 5.00 mIU/L   T4, free   Result Value Ref Range    Thyroxine, Free 1.48 0.93 - 1.70 ng/dL   Troponin   Result Value Ref Range    Troponin, High Sensitivity NOT REPORTED 0 - 14 ng/L    Troponin T <0.03 <0.03 ng/mL    Troponin Interp         Troponin   Result Value Ref Range    Troponin, High Sensitivity NOT REPORTED 0 - 14 ng/L    Troponin T <0.03 <0.03 ng/mL    Troponin Interp         Brain natriuretic peptide   Result Value Ref Range    Pro-BNP 4,661 (H) <300 pg/mL    BNP Interpretation Pro-BNP Reference Range:    CBC auto differential   Result Value Ref Range    WBC 6.1 3.5 - 11.3 k/uL    RBC 3.51 (L) 3.95 - 5.11 m/uL    Hemoglobin 11.2 (L) 11.9 - 15.1 g/dL    Hematocrit 34.2 (L) 36.3 - 47.1 %    MCV 97.4 82.6 - 102.9 fL    MCH 31.9 25.2 - 33.5 pg    MCHC 32.7 28.4 - 34.8 g/dL    RDW 14.3 11.8 - 14.4 %    Platelets 701 748 - 430 k/uL    MPV 12.0 8.1 - 13.5 fL    NRBC Automated 0.0 0.0 per 100 WBC    Differential Type NOT REPORTED     Seg Neutrophils 72 (H) 36 - 65 %    Lymphocytes 16 (L) 24 - 43 %    Monocytes 10 3 - 12 %    Eosinophils % 1 1 - 4 %    Basophils 1 0 - 2 %    Immature Granulocytes 0 0 %    Segs Absolute 4.42 1.50 - 8.10 k/uL    Absolute Lymph # 0.99 (L) 1.10 - 3.70 k/uL    Absolute Mono # 0.59 0.10 - 1.20 k/uL    Absolute Eos # 0.06 0.00 - 0.44 k/uL    Basophils Absolute 0.03 0.00 - 0.20 k/uL    Absolute Immature Granulocyte <0.03 0.00 - 0.30 k/uL    WBC Morphology NOT REPORTED     RBC Morphology NOT REPORTED     Platelet Estimate NOT REPORTED    Comprehensive metabolic panel   Result Value Ref Range    Glucose 146 (H) 70 - 99 mg/dL    BUN 13 8 - 23 mg/dL    CREATININE 1.02 (H) 0.50 - 0.90 mg/dL    Bun/Cre Ratio 13 9 - 20    Calcium 9.0 8.6 - 10.4 mg/dL    Sodium 136 135 - 144 mmol/L    Potassium 3.3 (L) 3.7 - 5.3 mmol/L    Chloride 97 (L) 98 - 107 mmol/L    CO2 26 20 - 31 mmol/L    Anion Gap 13 9 - 17 mmol/L    Alkaline Phosphatase 59 35 - 104 U/L    ALT 9 5 - 33 U/L    AST 22 <32 U/L    Total Bilirubin 0.76 0.3 - 1.2 mg/dL    Total Protein 7.5 6.4 - 8.3 g/dL    Alb 3.5 3.5 - 5.2 g/dL    Albumin/Globulin Ratio 0.9 (L) 1.0 - 2.5    GFR Non- 51 (L) >60 mL/min    GFR African American >60 >60 mL/min    GFR Comment          GFR Staging         Magnesium   Result Value Ref Range    Magnesium 1.6 1.6 - 2.6 mg/dL   CBC auto differential   Result Value Ref Range    WBC 5.3 3.5 - 11.3 k/uL    RBC 3.23 (L) 3.95 - 5.11 m/uL    Hemoglobin 10.4 (L) 11.9 - 15.1 g/dL    Hematocrit 31.2 (L) 36.3 - 47.1 %    MCV 96.6 82.6 - 102.9 fL    MCH 32.2 25.2 - 33.5 pg    MCHC 33.3 28.4 - 34.8 g/dL    RDW 14.1 11.8 - 14.4 %    Platelets 682 690 - 176 k/uL    MPV 12.0 8.1 - 13.5 fL    NRBC Automated 0.0 0.0 per 100 WBC    Differential Type NOT REPORTED     Seg Neutrophils 62 36 - 65 %    Lymphocytes 23 (L) 24 - 43 %    Monocytes 11 3 - 12 %    Eosinophils % 3 1 - 4 %    Basophils 1 0 - 2 %    Immature Granulocytes 0 0 %    Segs Absolute 3.27 1.50 - 8.10 k/uL    Absolute Lymph # 1.20 1.10 - 3.70 k/uL    Absolute Mono # 0.60 0.10 - 1.20 k/uL    Absolute Eos # 0.14 0.00 - 0.44 k/uL    Basophils Absolute 0.05 0.00 - 0.20 k/uL    Absolute Immature Granulocyte <0.03 0.00 - 0.30 k/uL    WBC Morphology NOT REPORTED     RBC Morphology NOT REPORTED     Platelet Estimate NOT REPORTED    Comprehensive metabolic panel   Result Value Ref Range    Glucose 119 (H) 70 - 99 mg/dL    BUN 12 8 - 23 mg/dL    CREATININE 0.69 0.50 - 0.90 mg/dL AST 18 <32 U/L    Total Bilirubin 0.94 0.3 - 1.2 mg/dL    Total Protein 6.6 6.4 - 8.3 g/dL    Alb 3.1 (L) 3.5 - 5.2 g/dL    Albumin/Globulin Ratio 0.9 (L) 1.0 - 2.5    GFR Non-African American >60 >60 mL/min    GFR African American >60 >60 mL/min    GFR Comment          GFR Staging         CBC auto differential   Result Value Ref Range    WBC 5.7 3.5 - 11.3 k/uL    RBC 3.57 (L) 3.95 - 5.11 m/uL    Hemoglobin 11.6 (L) 11.9 - 15.1 g/dL    Hematocrit 34.9 (L) 36.3 - 47.1 %    MCV 97.8 82.6 - 102.9 fL    MCH 32.5 25.2 - 33.5 pg    MCHC 33.2 28.4 - 34.8 g/dL    RDW 14.0 11.8 - 14.4 %    Platelets 238 525 - 448 k/uL    MPV 11.3 8.1 - 13.5 fL    NRBC Automated 0.0 0.0 per 100 WBC    Differential Type NOT REPORTED     Seg Neutrophils 65 36 - 65 %    Lymphocytes 20 (L) 24 - 43 %    Monocytes 11 3 - 12 %    Eosinophils % 3 1 - 4 %    Basophils 1 0 - 2 %    Immature Granulocytes 0 0 %    Segs Absolute 3.70 1.50 - 8.10 k/uL    Absolute Lymph # 1.13 1.10 - 3.70 k/uL    Absolute Mono # 0.60 0.10 - 1.20 k/uL    Absolute Eos # 0.19 0.00 - 0.44 k/uL    Basophils Absolute 0.04 0.00 - 0.20 k/uL    Absolute Immature Granulocyte <0.03 0.00 - 0.30 k/uL    WBC Morphology NOT REPORTED     RBC Morphology NOT REPORTED     Platelet Estimate NOT REPORTED    Comprehensive metabolic panel   Result Value Ref Range    Glucose 138 (H) 70 - 99 mg/dL    BUN 15 8 - 23 mg/dL    CREATININE 0.80 0.50 - 0.90 mg/dL    Bun/Cre Ratio 19 9 - 20    Calcium 8.7 8.6 - 10.4 mg/dL    Sodium 134 (L) 135 - 144 mmol/L    Potassium 3.8 3.7 - 5.3 mmol/L    Chloride 99 98 - 107 mmol/L    CO2 25 20 - 31 mmol/L    Anion Gap 10 9 - 17 mmol/L    Alkaline Phosphatase 51 35 - 104 U/L    ALT 8 5 - 33 U/L    AST 16 <32 U/L    Total Bilirubin 0.95 0.3 - 1.2 mg/dL    Total Protein 6.9 6.4 - 8.3 g/dL    Alb 3.0 (L) 3.5 - 5.2 g/dL    Albumin/Globulin Ratio 0.8 (L) 1.0 - 2.5    GFR Non-African American >60 >60 mL/min    GFR African American >60 >60 mL/min    GFR Comment          GFR

## 2020-01-27 NOTE — PROGRESS NOTES
Nutrition Assessment    Type and Reason for Visit: Reassess    Nutrition Recommendations:  Encourage oral intakes    Nutrition Assessment: Continued chewing difficulty r/t altered GI status, AEB cva hx with needs for cut up foods. Taking PO well per I/O, but very slow to self feed. Weight is up 0.8# from admission. Nutrition Risk Level: Moderate    Objective Information:  · Wound Type: None  · Current Nutrition Therapies:  · Oral Diet Orders: Carb Control 4 Carbs/Meal, Dysphagia  Soft and Bite-Sized (Dysphagia 3)   · Oral Diet intake: %  · Oral Nutrition Supplement (ONS) Orders: None  · Anthropometric Measures:  · Ht: 4' 11.84\" (152 cm)   · Current Body Wt: 134 lb 12.8 oz (61.1 kg)  · Admission Body Wt: 134 lb (60.8 kg)  · Usual Body Wt: 135 lb (61.2 kg)(130-139#)  · % Weight Change:  ,  0.8# > admission value  · Ideal Body Wt: 100 lb (45.4 kg), % Ideal Body 136%  · BMI Classification: BMI 25.0 - 29.9 Overweight    Lab Results   Component Value Date     (L) 01/27/2020    K 3.8 01/27/2020    CL 99 01/27/2020    CO2 25 01/27/2020    BUN 15 01/27/2020    CREATININE 0.80 01/27/2020    GLUCOSE 138 (H) 01/27/2020    CALCIUM 8.7 01/27/2020    PROT 6.9 01/27/2020    LABALBU 3.0 (L) 01/27/2020    BILITOT 0.95 01/27/2020    ALKPHOS 51 01/27/2020    AST 16 01/27/2020    ALT 8 01/27/2020    LABGLOM >60 01/27/2020    GFRAA >60 01/27/2020     No results for input(s): POCGLU in the last 72 hours.     Nutrition Interventions:   Continue current diet  Continued Inpatient Monitoring, Education declined, Coordination of Care    Nutrition Evaluation:   · Evaluation: Progressing toward goals   · Goals: PO > 75% of meals    · Monitoring: Meal Intake, I&O, Weight, Pertinent Labs, Chewing/Swallowing, Patient/Family Education    Electronically signed by Fred Chinchilla RD, LD on 1/27/20 at 12:04 PM    Contact Number: [phone]

## 2020-01-27 NOTE — PLAN OF CARE
Problem: Coping:  Goal: General experience of comfort will improve  Description  General experience of comfort will improve  Outcome: Met This Shift     Problem: Safety:  Goal: Will show no signs and symptoms of excessive bleeding  Description  Will show no signs and symptoms of excessive bleeding  Outcome: Met This Shift  Note:   No signs of bleeding noted. HGB stable. Problem: Falls - Risk of:  Goal: Will remain free from falls  Description  Will remain free from falls  1/27/2020 1044 by Magaly Padron RN  Outcome: Ongoing  Note:   Falling star program in place. Fall risk calculated at 61. Patient alert and disoriented. Bed alarm/chair alarm in use when family not at bedside. No Falls as of present. 1/26/2020 2108 by Jessika Kimble RN  Outcome: Ongoing  Note:   Pt is at high risk for falls. Non skid socks on. Bed in low position and wheels locked. Fall sign posted and bracelet on. Siderails up x3. Bed alarm on. Call light within reach. Will continue to assess. Goal: Absence of physical injury  Description  Absence of physical injury  Outcome: Ongoing  Note:   Sorenson fall score calculated on admission and daily at midnight and shows pt at 60 risk for fall. Bed in lowest position at all times with wheels locked. Bed alarm active. Falling star posted on door frame and yellow sticker visible on patient bracelet. Call light and bedside table with in reach. ID information verified as correct and visible. Will continue to monitor and intervene as necessary. Magaly Padron RN      Problem: Activity:  Goal: Ability to tolerate increased activity will improve  Description  Ability to tolerate increased activity will improve  Outcome: Ongoing  Note:   Monitor shows atrial fibrillation with controlled rate at rest.  Heart rate to 130's-140's when up to bedside commode.       Problem: Cardiac:  Goal: Ability to maintain an adequate cardiac output will improve  Description  Ability to maintain an adequate Unable to educate patient due to reported alzheimers. Daughter involved with patient's care updated. Problem: DISCHARGE BARRIERS  Goal: Patient's continuum of care needs are met  Outcome: Ongoing  Note:   Patient resides in private residence with daughter with home health. Case management for discharge planning. Problem: Nutrition  Goal: Optimal nutrition therapy  Description  Nutrition Problem: Biting/chewing difficulty  Intervention: Food and/or Nutrient Delivery: Modify current diet(add cut up meat)  Nutritional Goals: PO > 75% of meals     Outcome: Ongoing  Note:   Monitoring intake and output. Diet taken well this am.     Problem: SAFETY  Goal: Free from intentional harm  Outcome: Completed  Note:   Not at risk for intentional harm. Problem: PAIN  Goal: Patient's pain/discomfort is manageable  1/27/2020 1044 by Alfa Mathew RN  Outcome: Completed  Note:   Patient denies pain. 1/26/2020 2108 by Xiang Davis RN  Outcome: Ongoing  Note:   Pt denies pain at this time. Pain meds given as needed & as ordered. Will continue to assess.

## 2020-01-27 NOTE — PROGRESS NOTES
Si Bolus am scribing for and in the presence of Garrick Ramos. Hansa Fernandez MD, MS, F.A.C.C..    Patient: Hampton Halsted  : 1933  Date of Admission: 2020  Primary Care Physician: Goldie Loaiza  Today's Date: 2020    REASON FOR CONSULTATION: Atrial fibrillation with RVR,    HPI: Ms. Guerrero Sidhu is a 80 y.o. female who was admitted to the hospital with  atrial fibrillation with RVR directly from Dr. Son Courts office. She recently suffered a stroke, was treated at [facility] and discharged to a nursing home. Because of a large leg bruise she was sent to [address] and told to follow up with Dr. Tiffanie Beth where she was found to be in  atrial fibrillation with RVR leading to this hospitalization. Ms. Guerrero Sidhu says she is feeling good today and thinks she might get to go home today. She denies having any pain and her breathing is stable. Ms. Guerrero Sidhu also denied any current or recent chest pain, shortness of breath, abdominal pain, bleeding problems, problems with her medications or any other concerns at this time. Past Medical History:   Diagnosis Date    Atrial fibrillation ([address]     Dementia (HCC)     Depressive disorder, not elsewhere classified     Osteoarthrosis, unspecified whether generalized or localized, unspecified site     Pure hypercholesterolemia     Type II or unspecified type diabetes mellitus without mention of complication, not stated as uncontrolled     Unspecified essential hypertension        CURRENT ALLERGIES: Aldomet [methyldopa] REVIEW OF SYSTEMS: 14 systems were reviewed. Pertinent positives and negatives as above, all else negative.      Past Surgical History:   Procedure Laterality Date    JOINT REPLACEMENT      Social History:  Social History     Tobacco Use    Smoking status: Never Smoker    Smokeless tobacco: Never Used   Substance Use Topics    Alcohol use: No    Drug use: Not on file        MEDICATIONS:  diltiazem (CARDIZEM CD) extended release INCONTINENCE 10/1/19   Stephanie Flores MD   Diapers & Supplies ([employer]ED [employer]) [address] [facility] USE AS NEEDED FOR INCONTINENCE 10/1/19   Stephanie Flores MD   Incontinence Supply Disposable (ALWAYS FEMININE WIPES) MISC USE AS NEEDED FOR INCONTINENCE 10/1/19   Stephanie Flores MD       FAMILY HISTORY: family history is not on file. PHYSICAL EXAM:   BP 91/78   Pulse 105   Temp 98.5 °F (36.9 °C) (Temporal)   Resp 17   Ht 4' 11.84\" (1.52 m)   Wt 134 lb 12.8 oz (61.1 kg)   SpO2 97%   BMI 26.47 kg/m²  Body mass index is 26.47 kg/m². Constitutional: She is oriented to person, place, and time. She appears well-developed and well-nourished. In no acute distress. HEENT: Normocephalic and atraumatic. No JVD present. Carotid bruit is not present. No mass and no thyromegaly present. No lymphadenopathy present. Cardiovascular: Tachycardic rate, regularly irregular rhythm, normal heart sounds. Exam reveals no gallop and no friction rubs. 2/6 systolic murmur, 4th intercostal space on the LEFT must lateral to the sternal border. Pulmonary/Chest: Effort normal and breath sounds normal. No respiratory distress. She has no wheezes, rhonchi or rales. Abdominal: Soft, non-tender. Bowel sounds and aorta are normal. She exhibits no organomegaly, mass or bruit. Extremities: Trace. No cyanosis or clubbing. 2+ radial and carotid pulses. Distal extremity pulses: 2+ bilaterally. .   Neurological: She is alert and oriented to person, place, and time. No evidence of gross cranial nerve deficit. Coordination appeared normal.   Skin: Skin is warm and dry. There is no rash or diaphoresis. Psychiatric: She has a normal mood and affect.  Her speech is normal and behavior is normal.      MOST RECENT LABS ON RECORD:   Lab Results   Component Value Date    WBC 5.7 01/27/2020    HGB 11.6 (L) 01/27/2020    HCT 34.9 (L) 01/27/2020     01/27/2020    CHOL 182 03/26/2019    TRIG 95 03/26/2019    HDL 65 03/26/2019    ALT 8 01/27/2020 management     I discussed the patients case with Dr. Beryl Forde as well as Celeste Hankins. Once again, thank you for allowing me to participate in this patients care. Please do not hesitate to contact me if I could be of any further assistance. Sincerely,  Shelly Gaviria MD, MS, F.A.C.C. [facility] Cardiology Specialist    [address] [place] [address], [address] [place] [address]  Phone: [phone], Fax: [phone]     I believe that the risk of significant morbidity and mortality related to the patient's current medical conditions are: Intermediate. The documentation recorded by the scribe, accurately and completely reflects the services I personally performed and the decisions made by me. Garfield Borja MD, MS, F.A.C.C.  January 27, 2020

## 2020-01-28 VITALS
HEIGHT: 60 IN | SYSTOLIC BLOOD PRESSURE: 155 MMHG | TEMPERATURE: 97.7 F | OXYGEN SATURATION: 97 % | HEART RATE: 84 BPM | RESPIRATION RATE: 16 BRPM | WEIGHT: 134.2 LBS | DIASTOLIC BLOOD PRESSURE: 79 MMHG | BODY MASS INDEX: 26.35 KG/M2

## 2020-01-28 PROCEDURE — 6370000000 HC RX 637 (ALT 250 FOR IP): Performed by: FAMILY MEDICINE

## 2020-01-28 PROCEDURE — 2580000003 HC RX 258: Performed by: FAMILY MEDICINE

## 2020-01-28 RX ORDER — LISINOPRIL 20 MG/1
20 TABLET ORAL DAILY
Qty: 30 TABLET | Refills: 2 | Status: ON HOLD | OUTPATIENT
Start: 2020-01-28 | End: 2020-03-25 | Stop reason: HOSPADM

## 2020-01-28 RX ORDER — DILTIAZEM HYDROCHLORIDE 240 MG/1
240 CAPSULE, COATED, EXTENDED RELEASE ORAL DAILY
Qty: 30 CAPSULE | Refills: 3 | Status: ON HOLD | OUTPATIENT
Start: 2020-01-29 | End: 2020-03-25 | Stop reason: HOSPADM

## 2020-01-28 RX ORDER — DOCUSATE SODIUM 100 MG/1
100 CAPSULE, LIQUID FILLED ORAL 2 TIMES DAILY PRN
Qty: 30 CAPSULE | Refills: 2 | Status: ON HOLD | OUTPATIENT
Start: 2020-01-28 | End: 2020-05-18 | Stop reason: HOSPADM

## 2020-01-28 RX ADMIN — FLUOXETINE 10 MG: 10 CAPSULE ORAL at 09:15

## 2020-01-28 RX ADMIN — APIXABAN 5 MG: 5 TABLET, FILM COATED ORAL at 09:15

## 2020-01-28 RX ADMIN — SPIRONOLACTONE 25 MG: 25 TABLET, FILM COATED ORAL at 09:15

## 2020-01-28 RX ADMIN — Medication 10 ML: at 09:16

## 2020-01-28 RX ADMIN — METOPROLOL TARTRATE 25 MG: 25 TABLET, FILM COATED ORAL at 09:15

## 2020-01-28 RX ADMIN — DILTIAZEM HYDROCHLORIDE 240 MG: 240 CAPSULE, COATED, EXTENDED RELEASE ORAL at 09:15

## 2020-01-28 RX ADMIN — LISINOPRIL 20 MG: 20 TABLET ORAL at 09:15

## 2020-01-28 NOTE — DISCHARGE SUMMARY
Discharge Summary    Erin Chapin  :  1933  MRN:  [id number]    Admit date:  2020      Discharge date: 2020     Admitting Physician:  Leeanna Yap MD    Consultants:  Dr. Barney Dubin, cardiology    Procedures: none    Complications: none    Discharge Condition: good    Hospital Course:   Erin Chapin is a 80 y.o. female admitted with Atrial fibrillation and bruising to RLE. Patient was seen in the ER on 2020 with c/o bruising to the RLE starting from Mid thigh and extending below the knee. She thought that it started that day but had gotten worse over the day. She reported that she has been dong leg stockings recently and PT had been at her house yesterday helping her with them and noticed the bruising. She denied any recent falls or injuries. She denied any CP or SOB. She denied palpitations. She denied any recent illness. She denied n/v/d. She was recently started on Eloquis after a recent stroke. The daughter stated that the home health nurse was unsure if she was able to palpate pulses in the leg. On the day of admission, she followed up in the office and was found to be in Atrial Fibrillation. There was concern of vascular occlusion to the Right leg especially, however that was ruled out quickly on admission via venous dopplers. Both legs were cool to the touch and 1-2+ edema, and now are warm to the touch, without edema, and normal skin color. I appreciated Dr. Juno Wang consult and assistance with medication regulation. Currently she is up in the chair in no distress and pleasant with family at side. Alert but disoriented at times. Denies CP or palpitations. Denies SOB or wheezing. Tolerating diet. Tolerating activity well. Exam:  GEN:  well-developed and well nourished, in no acute distress, alert and disoriented  EYES: No gross abnormalities. , PERRL and EOMI  NECK: normal, supple, no lymphadenopathy,  no carotid bruits  PULM: clear to auscultation bilaterally- no wheezes, rales or rhonchi, normal air movement, no respiratory distress  COR: no murmurs, no gallops, irregularly irregular, S1 normal, S2 normal and Atrial Fibrillation   ABD:  soft, non-tender, non-distended, normal bowel sounds, no masses or organomegaly  EXT:   no cyanosis, clubbing or edema present    NEURO: follows commands, SILVA, no deficits  SKIN:  no rashes or significant lesions    Significant Diagnostic Studies:   Lab Results   Component Value Date    WBC 5.7 01/27/2020    HGB 11.6 (L) 01/27/2020     01/27/2020       Lab Results   Component Value Date    BUN 15 01/27/2020    CREATININE 0.80 01/27/2020     (L) 01/27/2020    K 3.8 01/27/2020    CALCIUM 8.7 01/27/2020    CL 99 01/27/2020    CO2 25 01/27/2020    LABGLOM >60 01/27/2020       Lab Results   Component Value Date    WBCUA 0 TO 2 02/23/2018    RBCUA None 02/23/2018    EPITHUA 0 TO 2 02/23/2018    LEUKOCYTESUR SMALL (A) 01/20/2020    SPECGRAV 1.025 01/20/2020    GLUCOSEU NEG 01/20/2020    KETUA NEG 01/20/2020    PROTEINU NEG 01/20/2020    HGBUR NEGATIVE 02/23/2018    CASTUA NOT REPORTED 02/23/2018    CRYSTUA NOT REPORTED 02/23/2018    BACTERIA NOT REPORTED 02/23/2018    YEAST NOT REPORTED 02/23/2018       Xr Chest Standard (2 Vw)    Result Date: 1/24/2020  EXAMINATION: TWO XRAY VIEWS OF THE CHEST 1/24/2020 4:59 pm COMPARISON: 12/13/2008. HISTORY: ORDERING SYSTEM PROVIDED HISTORY: afib TECHNOLOGIST PROVIDED HISTORY: afib FINDINGS: The cardiac silhouette is borderline in size and stable. Aortic vascular calcification with mild tortuosity. Bilateral pleural effusions, left greater than right. Mild dependent changes, likely atelectasis. No evidence of overt failure at this time. Bones are osteopenic with mild degenerative changes in the thoracic spine. No acute osseous abnormality. Bilateral pleural effusions with mild bibasilar atelectasis, left greater than right. No overt failure.      Vl Dup Lower Extremity Venous Bilateral    Result Date: 1/24/2020    [facility] Lower Extremities DVT Study Procedure   Patient Name  [facility]      Date of Study           01/24/2020                VALENCIA HICKS   Date of Birth 01/23/1933   Gender                  Female   Age           80 year(s)   Race                    Other   Room Number   I305         Height:                 62 inch, 157.48 cm   Corporate ID  [id number]     Weight:                 136 pounds, 61.7 kg  #   Patient Acct  [de-identified]    BSA:        1.62 m^2    BMI:      24.87 kg/m^2  #   MR #          [id number]       [address], [address]   Accession #   [id number]  Interpreting Physician  Laly Yusuf MD   Referring     Charissa Hudson      Referring Physician  Nurse         Zakiya Szymanski CNP  Practitioner  Procedure Type of Study:   Veins: Lower Extremities DVT Study, Venous Scan Lower Bilateral.  Indications for Study:Pain, leg. Patient Status: In Patient. Technical Quality:Good visualization. Comments:Report faxed and verbal given to Dr Jayda Min 1- 16:24  Conclusions   Summary   No evidence of superficial or deep venous thrombosis in both lower  extremities. Signature   ----------------------------------------------------------------  Electronically signed by DIONNA Montes(Sonographer) on  01/24/2020 04:28 PM  ----------------------------------------------------------------   ----------------------------------------------------------------  Electronically signed by Laly Yusuf MD(Interpreting  physician) on 01/24/2020 06:24 PM  ----------------------------------------------------------------  Findings:   Right Impression:                      Left Impression:  The common femoral, femoral,deep       The common femoral, femoral,deep  femoral, popliteal, tibials and        femoral popliteal, tibials and  saphenous veins are compressible with  saphenous veins are compressible  normal doppler responses. with normal doppler responses.   The !None      ! +------------------------------------+----------+---------------+----------+ ! GSV Thigh                           ! Yes       ! Yes            ! None      ! +------------------------------------+----------+---------------+----------+ ! GSV Knee                            ! Yes       ! Yes            ! None      ! +------------------------------------+----------+---------------+----------+ ! GSV Ankle                           ! Yes       ! Yes            ! None      ! +------------------------------------+----------+---------------+----------+ ! SSV                                 ! Yes       ! Yes            ! None      ! +------------------------------------+----------+---------------+----------+ Right Doppler Measurements +---------------------------+------+------+--------------------------------+ ! Location                   ! Signal!Reflux! Reflux (msec)                   ! +---------------------------+------+------+--------------------------------+ ! Common Femoral             !Phasic! No    !                                ! +---------------------------+------+------+--------------------------------+ ! Prox Femoral               !Phasic! No    !                                ! +---------------------------+------+------+--------------------------------+ ! Popliteal                  !Phasic! No    !                                ! +---------------------------+------+------+--------------------------------+ Left Lower Extremities DVT Study Measurements Left 2D Measurements +------------------------------------+----------+---------------+----------+ ! Location                            ! Visualized! Compressibility! Thrombosis! +------------------------------------+----------+---------------+----------+ ! Common Femoral                      !Yes       ! Yes            ! None      ! +------------------------------------+----------+---------------+----------+ ! Prox Femoral                        !Yes       ! Yes !None      ! +------------------------------------+----------+---------------+----------+ ! Mid Femoral                         !Yes       ! Yes            ! None      ! +------------------------------------+----------+---------------+----------+ ! Dist Femoral                        !Yes       ! Yes            ! None      ! +------------------------------------+----------+---------------+----------+ ! Deep Femoral                        !Yes       ! Yes            ! None      ! +------------------------------------+----------+---------------+----------+ ! Popliteal                           !Yes       ! Yes            ! None      ! +------------------------------------+----------+---------------+----------+ ! Sapheno Femoral Junction            ! Yes       ! Yes            ! None      ! +------------------------------------+----------+---------------+----------+ ! PTV                                 ! Yes       ! Yes            ! None      ! +------------------------------------+----------+---------------+----------+ ! Peroneal                            !Yes       ! Yes            ! None      ! +------------------------------------+----------+---------------+----------+ ! Gastroc                             ! Yes       ! Yes            ! None      ! +------------------------------------+----------+---------------+----------+ ! GSV Thigh                           ! Yes       ! Yes            ! None      ! +------------------------------------+----------+---------------+----------+ ! GSV Knee                            ! Yes       ! Yes            ! None      ! +------------------------------------+----------+---------------+----------+ ! GSV Ankle                           ! Yes       ! Yes            ! None      ! +------------------------------------+----------+---------------+----------+ ! SSV                                 ! Yes       ! Yes            ! None      ! +------------------------------------+----------+---------------+----------+ Left MATS) MISC  USE AS NEEDED FOR INCONTINENCE             diltiazem (CARDIZEM CD) 240 MG extended release capsule  Take 1 capsule by mouth daily             docusate sodium (COLACE) 100 MG capsule  Take 1 capsule by mouth 2 times daily as needed for Constipation             FLUoxetine (PROZAC) 10 MG capsule  Take 1 capsule by mouth daily for 7 days             hydrALAZINE (APRESOLINE) 10 MG tablet  Take 1 tablet by mouth 2 times daily as needed (90) Prn for bp >140 mm of hg             Incontinence Supply Disposable (ALWAYS FEMININE WIPES) MISC  USE AS NEEDED FOR INCONTINENCE             Incontinence Supply Disposable (FITTED BRIEFS MEDIUM) MISC  USE AS NEEDED FOR INCONTINENCE             lisinopril (PRINIVIL;ZESTRIL) 20 MG tablet  Take 1 tablet by mouth daily             metoprolol tartrate (LOPRESSOR) 25 MG tablet  Take 1 tablet by mouth 2 times daily             spironolactone (ALDACTONE) 25 MG tablet  Take 1 tablet by mouth daily                 Patient Instructions: Activity: activity as tolerated  Diet: cardiac diet  Wound Care: none needed  Other: Resume Home Health Services     Disposition:   Discharge to Home with home health    Follow up:  Patient will be followed by Ludivina Pappas MD in 1-2 weeks. Follow up with Dr. Miles Newberry in 2 weeks.      CORE MEASURES on Discharge (if applicable)  ACE/ARB in CHF: NA  Statin in MI: NA  ASA in MI: NA  Statin in CVA: NA  Antiplatelet in CVA: NA    Total time spent on discharge services: 45 minutes    Including the following activities:  Evaluation and Management of patient  Discussion with patient and/or surrogate about current care plan  Coordination with Case Management and/or   Coordination of care with Consultants (if applicable)   Coordination of care with Receiving Facility Physician (if applicable)  Completion of DME forms (if applicable)  Preparation of Discharge Summary  Preparation of Medication Reconciliation  Preparation of Discharge Prescriptions    Signed:  ALEYDA Howard, NP-C  1/28/2020, 9:58 AM

## 2020-01-28 NOTE — PROGRESS NOTES
Dishcarge instructions reviewed with patient's POA/daughter Precshannon Stapleton, voices understanding of all. Denies questions or concerns at this time.

## 2020-01-28 NOTE — PROGRESS NOTES
([facility]    Diabetes mellitus without complication ([facility]    Essential hypertension    Hyperlipidemia    Ischemic stroke ([facility]  Resolved Problems:    * No resolved hospital problems.  *        PLAN:  · Discharge home with Home Health  · Will speak with Dr. Denae Fischer regarding her [address] [place] [address] , APRN, NP-C

## 2020-01-29 ENCOUNTER — CARE COORDINATION (OUTPATIENT)
Dept: CASE MANAGEMENT | Age: 85
End: 2020-01-29

## 2020-01-29 PROCEDURE — 1111F DSCHRG MED/CURRENT MED MERGE: CPT | Performed by: FAMILY MEDICINE

## 2020-02-05 ENCOUNTER — CARE COORDINATION (OUTPATIENT)
Dept: CASE MANAGEMENT | Age: 85
End: 2020-02-05

## 2020-02-05 NOTE — CARE COORDINATION
[address] [facility] Transitions Follow Up Call    2020    Patient: Melissa Sanchez  Patient : 1933   MRN: <[id number]>  Reason for Admission: A fib with RVR   Discharge Date: 20 RARS: Readmission Risk Score: 12    Spoke with: Dtr/Khloe    Report pt is doing well. No new chest pain/pressure or palpitation events. Has some BLE edema w/ itchy skin. Reports pt scratched legs and now weeping from areas of scratches. Reports [facility] is doing minor wound care and wrapping legs. Denies any s/s developing infection. No med concerns and denies any additional home needs at this time. Attended TCM 2/3/2020. Enc to call w/ any needs, v/u.    Care Transitions Subsequent and Final Call    Subsequent and Final Calls  Do you have any ongoing symptoms?:  Yes  Patient-reported symptoms:  Other  Have your medications changed?:  No  Do you have any questions related to your medications?:  No  Do you currently have any active services?:  Yes  Are you currently active with any services?:  Home Health  Do you have any needs or concerns that I can assist you with?:  No  Identified Barriers: Other, Lack of Education  Care Transitions Interventions                          Other Interventions:             Follow Up  Future Appointments   Date Time Provider Kiran Hernadez   2/10/2020 11:20 AM Nimesh Galo MD TIFF CARD [facility]   3/3/2020 10:00 AM MD Linda Blankenship RN

## 2020-02-10 ENCOUNTER — OFFICE VISIT (OUTPATIENT)
Dept: CARDIOLOGY | Age: 85
End: 2020-02-10
Payer: MEDICARE

## 2020-02-10 ENCOUNTER — CARE COORDINATION (OUTPATIENT)
Dept: CASE MANAGEMENT | Age: 85
End: 2020-02-10

## 2020-02-10 VITALS
BODY MASS INDEX: 22.19 KG/M2 | WEIGHT: 133.2 LBS | RESPIRATION RATE: 16 BRPM | DIASTOLIC BLOOD PRESSURE: 68 MMHG | SYSTOLIC BLOOD PRESSURE: 117 MMHG | HEART RATE: 58 BPM | OXYGEN SATURATION: 97 % | HEIGHT: 65 IN

## 2020-02-10 PROBLEM — Z79.01 ENCOUNTER FOR CURRENT LONG-TERM USE OF ANTICOAGULANTS: Status: ACTIVE | Noted: 2020-02-10

## 2020-02-10 PROCEDURE — 1123F ACP DISCUSS/DSCN MKR DOCD: CPT | Performed by: FAMILY MEDICINE

## 2020-02-10 PROCEDURE — 4040F PNEUMOC VAC/ADMIN/RCVD: CPT | Performed by: FAMILY MEDICINE

## 2020-02-10 PROCEDURE — 99214 OFFICE O/P EST MOD 30 MIN: CPT | Performed by: FAMILY MEDICINE

## 2020-02-10 PROCEDURE — 1111F DSCHRG MED/CURRENT MED MERGE: CPT | Performed by: FAMILY MEDICINE

## 2020-02-10 PROCEDURE — G8420 CALC BMI NORM PARAMETERS: HCPCS | Performed by: FAMILY MEDICINE

## 2020-02-10 PROCEDURE — G8427 DOCREV CUR MEDS BY ELIG CLIN: HCPCS | Performed by: FAMILY MEDICINE

## 2020-02-10 PROCEDURE — G8482 FLU IMMUNIZE ORDER/ADMIN: HCPCS | Performed by: FAMILY MEDICINE

## 2020-02-10 PROCEDURE — 1090F PRES/ABSN URINE INCON ASSESS: CPT | Performed by: FAMILY MEDICINE

## 2020-02-10 PROCEDURE — 1036F TOBACCO NON-USER: CPT | Performed by: FAMILY MEDICINE

## 2020-02-10 PROCEDURE — 99212 OFFICE O/P EST SF 10 MIN: CPT | Performed by: FAMILY MEDICINE

## 2020-02-10 RX ORDER — SPIRONOLACTONE 25 MG/1
25 TABLET ORAL DAILY
Qty: 90 TABLET | Refills: 3 | Status: ON HOLD | OUTPATIENT
Start: 2020-02-10 | End: 2020-05-18 | Stop reason: HOSPADM

## 2020-02-10 RX ORDER — ATORVASTATIN CALCIUM 20 MG/1
20 TABLET, FILM COATED ORAL DAILY
Qty: 90 TABLET | Refills: 3 | Status: SHIPPED | OUTPATIENT
Start: 2020-02-10 | End: 2020-05-08 | Stop reason: SDUPTHER

## 2020-02-10 NOTE — PROGRESS NOTES
Melissa Peñaloza am scribing for and in the presence of Alecia Gaviria MD, MS, F.A.C.C..     Ms. Marlene Gomes is a 80 y.o. female who was admitted to the hospital on 1/23/2020 with  atrial fibrillation with RVR directly from Dr. Brendan Sánchez office. She recently suffered a stroke, was treated at [facility] and discharged to a nursing home. Because of a large leg bruise she was sent to [address] and told to follow up with Dr. Antonio Storey where she was found to be in atrial fibrillation with RVR leading to the hospitalization. She had an echocardiogram done on 1/24/2020 that showed the global LV systolic function is difficult to assess due to the patients rate and rhythm but appears to be at the lower limits of normal with an estimated EF of 50-55%. Moderate septal hypertrophy. Moderate aortic leaflet calcification without stenosis but with mild to moderate regurgitation. Moderate tricuspid regurgitation. Since the last time I saw Ms. Marlene Gomes, she has been doing better. She has her daughter present with her today. Her daughter has been keeping track of her blood pressures and heart rates and they have been well controlled. She also denied any current or recent chest pain, shortness of breath, lightheaded/dizziness or palpitations. She denies any abdominal pain, bleeding problems, problems with her medications or any other concerns at this time. Past Medical History:   Diagnosis Date    Atrial fibrillation ([address]     Dementia (HCC)     Depressive disorder, not elsewhere classified     Osteoarthrosis, unspecified whether generalized or localized, unspecified site     Pure hypercholesterolemia     Type II or unspecified type diabetes mellitus without mention of complication, not stated as uncontrolled     Unspecified essential hypertension        CURRENT ALLERGIES: Aldomet [methyldopa] REVIEW OF SYSTEMS: 14 systems were reviewed. Pertinent positives and negatives as above, all else negative.      Past Surgical Incontinence Supply Disposable (ALWAYS FEMININE WIPES) MISC USE AS NEEDED FOR INCONTINENCE 10/1/19  Yes Yeny Allen MD       FAMILY HISTORY: family history is not on file. PHYSICAL EXAM:   /68 (Site: Right Upper Arm, Position: Sitting, Cuff Size: Medium Adult)   Pulse 58   Resp 16   Ht 5' 5\" (1.651 m)   Wt 133 lb 3.2 oz (60.4 kg)   SpO2 97%   BMI 22.17 kg/m²  Body mass index is 22.17 kg/m². Constitutional: She is oriented to person, place, and time. She appears well-developed and well-nourished. In no acute distress. HEENT: Normocephalic and atraumatic. No JVD present. Carotid bruit is not present. No mass and no thyromegaly present. No lymphadenopathy present. Cardiovascular: Tachycardic rate, regularly irregular rhythm, normal heart sounds. Exam reveals no gallop and no friction rubs. 2/6 systolic murmur, 5th intercostal space on the LEFT in the mid-clavicular line (cardiac apex). Pulmonary/Chest: Effort normal and breath sounds normal. No respiratory distress. She has no wheezes, rhonchi or rales. Abdominal: Soft, non-tender. Bowel sounds and aorta are normal. She exhibits no organomegaly, mass or bruit. Extremities: Trace. No cyanosis or clubbing. 2+ radial and carotid pulses. Distal extremity pulses: 2+ bilaterally. .   Neurological: She is alert and oriented to person, place, and time. No evidence of gross cranial nerve deficit. Coordination appeared normal.   Skin: Skin is warm and dry. There is no rash or diaphoresis. Psychiatric: She has a normal mood and affect.  Her speech is normal and behavior is normal.      MOST RECENT LABS ON RECORD:   Lab Results   Component Value Date    WBC 5.7 01/27/2020    HGB 11.6 (L) 01/27/2020    HCT 34.9 (L) 01/27/2020     01/27/2020    CHOL 182 03/26/2019    TRIG 95 03/26/2019    HDL 65 03/26/2019    ALT 8 01/27/2020    AST 16 01/27/2020     (L) 01/27/2020    K 3.8 01/27/2020    CL 99 01/27/2020    CREATININE 0.80 01/27/2020

## 2020-02-10 NOTE — CARE COORDINATION
[facility] Transitions Follow Up Call- final call     2/10/2020    Patient: Melissa Sanchez  Patient : [id number]   MRN: <[id number]>  Reason for Admission: A fib with RVR   Discharge Date: 20 RARS: Readmission Risk Score: 12         Spoke with: pt dtr Micki Chakraborty    Call to pt dtr who states pt doing \"good\"  States appt with cardiologist today who ordered (atorvastatin) new pill  States pt has not complained of CP, SOB or palpitations nor has she appeared to have any  States leg (bruising) is healing well and still being wrapped by home care nurse who is monitoring this  States no swelling in her legs or feet  Denies needs  Writer informs this is final (CTC) phone call- v/u. Encouraged call writer/ CTC or providers if questions or concerns- v/u. Episode closed      Care Transitions Subsequent and Final Call    Subsequent and Final Calls  Do you have any ongoing symptoms?:  No  Have your medications changed?:  No  Do you have any questions related to your medications?:  No  Do you currently have any active services?:  Yes  Are you currently active with any services?:  Home Health  Do you have any needs or concerns that I can assist you with?:  No  Identified Barriers:  Lack of Education  Care Transitions Interventions                          Other Interventions:             Follow Up  Future Appointments   Date Time Provider Kiran Hernadez   3/3/2020 10:00 AM MD Linda Blankenship   2020 10:20 AM Nimesh Galo MD TIF CARD Ariana Velez RN

## 2020-02-20 ENCOUNTER — CARE COORDINATION (OUTPATIENT)
Dept: CARE COORDINATION | Age: 85
End: 2020-02-20

## 2020-02-20 NOTE — CARE COORDINATION
cost issues with medications No      Zone Management tool reviewed today is applicable:   Yes, Diabetes  Will mail Zone sheet       Reviewed social needs/Home Needs if any:   · Does patient have enough food? Yes, denied wanting referral for meals on wheels. Khloe staying with patient and making food   · Does patient have transportation to appointment/store/pharmacy, etc?Yes, Khloe   · Does patient need any help in the home? Denies   · If yes, what type of help? N/a          Reviewed Upcoming follow up appointments with Khloe  Future Appointments   Date Time Provider Kiran Hernadez   3/3/2020 10:00 AM MD Linda Piresakkumar LOIS   8/17/2020 10:20 AM Lilian Polanco MD TI[facility]       Goals reviewed. Patient to continue to work to improve:   Goals      Conditions and Symptoms      I will schedule office visits, as directed by my provider. I will keep my appointment or reschedule if I have to cancel. I will notify my provider of any barriers to my plan of care. I will follow my Zone Management tool to seek urgent or emergent care. I will notify my provider of any symptoms that indicate a worsening of my condition. Barriers: overwhelmed by complexity of regimen, stress and lack of education  Plan for overcoming my barriers: working with this ambulatory care manager  -Follow Zone sheet- DM   -Continue with [facility]- PT/OT/ST     Confidence: 7/10  Anticipated Goal Completion Date: 5/20/2020              Education Reviewed with patient today: See Education Module. Interventions completed today:  · Patient to reach out to care coordinator at [phone] or MD office with questions. · Reminded Khloe of walk in care clinic availability/hours; and when to utilize the facility if MD office not available. Care Coordinator Plan of Care: This nurse CC will plan to mail CC Introduction letter along with business card to patient/Khloe.  Will route encounter to PCP for order for briefs/wipes to [facility] [address] [address] in [place]. Will plan to reach out to Khloe next week- continue CC protocol and SDOH. Follow up on transport chair. Ambulatory Care Coordination Assessment    Care Coordination Protocol  Program Enrollment:  Rising Risk  Referral from Primary Care Provider:  No  Week 1 - Initial Assessment     Do you have all of your prescriptions and are they filled?:  Yes  Barriers to medication adherence:  None  Are you able to afford your medications?:  Yes  How often do you have trouble taking your medications the way you have been told to take them?:  I always take them as prescribed.      Do you have Home O2 Therapy?:  No      Ability to seek help/take action for Emergent Urgent situations i.e. fire, crime, inclement weather or health crisis.:  Needs Assistance  Ability to ambulate to restroom:  Needs Assistance  Ability handle personal hygeine needs (bathing/dressing/grooming):  Needs Assistance  Ability to manage Medications:  Dependent  Ability to prepare Food Preparation:  Dependent  Ability to maintain home (clean home, laundry):  Dependent  Ability to drive and/or has transportation:  Dependent  Ability to do shopping:  Dependent  Ability to manage finances:  Dependent  Is patient able to live independently?:  No     Current Housing:  Private Residence        Per the Fall Risk Screening, did the patient have 2 or more falls or 1 fall with injury in the past year?:  No     Frequent urination at night?:  No  Do you use rails/bars?:  Yes  Do you have a non-slip tub mat?:  Yes     Are you experiencing loss of meaning?:  No  Are you experiencing loss of hope and peace?:  No     Thinking about your patient's physical health needs, are there any symptoms or problems (risk indicators) you are unsure about that require further investigation?:  No identified areas of uncertainly or problems already being investigated   Are the patients physical health problems impacting on their mental well-being?:  Mild impact on mental well-being e.g. \"\"feeling fed-up\"\", \"\"reduced enjoyment\"\"   Are there any problems with your patients lifestyle behaviors (alcohol, drugs, diet, exercise) that are impacting on physical or mental well-being?:  No identified areas of concern   Do you have any other concerns about your patients mental well-being? How would you rate their severity and impact on the patient?:  No identified areas of concern   How would you rate their home environment in terms of safety and stability (including domestic violence, insecure housing, neighbor harassment)?:  Consistently safe, supportive, stable, no identified problems   How do daily activities impact on the patient's well-being? (include current or anticipated unemployment, work, caregiving, access to transportation or other):  No identified problems or perceived positive benefits   How would you rate their social network (family, work, friends)?:  Adequate participation with social networks   How would you rate their financial resources (including ability to afford all required medical care)?:  Financially secure, resources adequate, no identified problems   How wells does the patient now understand their health and well-being (symptoms, signs or risk factors) and what they need to do to manage their health?:  Reasonable to good understanding and already engages in managing health or is willing to undertake better management   How well do you think your patient can engage in healthcare discussions? (Barriers include language, deafness, aphasia, alcohol or drug problems, learning difficulties, concentration):  Some difficulties in communication with or without moderate barriers   Do other services need to be involved to help this patient?:  Other care/services in place and adequate   Are current services involved with this patient well-coordinated? (Include coordination with other services you are now recommendation):   All required care/services in place and well-coordinated   Suggested Interventions and Community Resources  Fall Risk Prevention:  Completed Home Health Services: In Process (Comment: [facility]- PT/OT/ST)   Meals on Wheels:  Declined   Medication Assistance Program:  Declined   Medi Set or Pill Pack:  Completed   Occupational Therapy: In Process   Physical Therapy: In Process   Transportation Services:  Completed   Zone Management Tools:  Completed         Set up/Review an Education Plan, Schedule an appointment with the patient's PCP, Set up/Review Goals              Prior to Admission medications    Medication Sig Start Date End Date Taking?  Authorizing Provider   atorvastatin (LIPITOR) 20 MG tablet Take 1 tablet by mouth daily 2/10/20  Yes Louisa Marcano MD   spironolactone (ALDACTONE) 25 MG tablet Take 1 tablet by mouth daily 2/10/20  Yes Louisa Marcano MD   lisinopril (PRINIVIL;ZESTRIL) 20 MG tablet Take 1 tablet by mouth daily 1/28/20  Yes Estil Troy, APRN - CNP   metoprolol tartrate (LOPRESSOR) 25 MG tablet Take 1 tablet by mouth 2 times daily 1/28/20  Yes Estil Troy, APRN - CNP   diltiazem (CARDIZEM CD) 240 MG extended release capsule Take 1 capsule by mouth daily 1/29/20  Yes Estil Troy, APRN - CNP   docusate sodium (COLACE) 100 MG capsule Take 1 capsule by mouth 2 times daily as needed for Constipation 1/28/20  Yes Estil Troy, APRN - CNP   apixaban (ELIQUIS) 5 MG TABS tablet Take 5 mg by mouth 2 times daily   Yes Historical Provider, MD   hydrALAZINE (APRESOLINE) 10 MG tablet Take 1 tablet by mouth 2 times daily as needed (90) Prn for bp >140 mm of hg 1/3/20  Yes Cristina Espinal MD   acetaminophen (TYLENOL) 500 MG tablet Take 500 mg by mouth every 4 hours as needed for Pain   Yes Historical Provider, MD   FLUoxetine (PROZAC) 10 MG capsule Take 1 capsule by mouth daily for 7 days 1/9/20 2/10/20  Cristina Espinal MD   Incontinence Supply Disposable (FITTED BRIEFS MEDIUM) MISC USE

## 2020-02-27 ENCOUNTER — CARE COORDINATION (OUTPATIENT)
Dept: CARE COORDINATION | Age: 85
End: 2020-02-27

## 2020-02-27 RX ORDER — OXYMETAZOLINE HYDROCHLORIDE 0 G/ML
SPRAY NASAL
Qty: 100 EACH | Refills: 3 | Status: ON HOLD | OUTPATIENT
Start: 2020-02-27 | End: 2020-05-18 | Stop reason: HOSPADM

## 2020-02-27 RX ORDER — DIAPER,BRIEF,ADULT, DISPOSABLE
EACH MISCELLANEOUS
Qty: 100 ML | Refills: 3 | Status: ON HOLD | OUTPATIENT
Start: 2020-02-27 | End: 2020-05-18 | Stop reason: HOSPADM

## 2020-02-27 NOTE — CARE COORDINATION
Ambulatory Care Coordination Note  2/27/2020  CM Risk Score: 5  Charlson 10 Year Mortality Risk Score: 98%     ACC: Mauro Jaimes, DEREK    Summary Note:   Date Care Coordination Episode Started:   2/20/2020    Reason For Call Today:   Follow up on getting transport chair,briefs and wipes from medical supply company. Continue CC Protocol.     -Spoke with Keegan Nogueira daughter today   -Khloe states Catherene Cowden is doing \"well\"   -Reminded of PCP appointment next week     Topics discussed today:   1.) Transport Chair  -Khloe states they did get transport chair picked up from [employer] [employer] in [place]  -Really liking the chair     2.) Briefs/Wipes   -Khloe states she did not get a call about the wipes/briefs   -Khloe wondering if order can be sent- has been paying out of pocket for some time now    Call To:  -Call placed to [employer] in [facility] with Reyna Desai  -ACM questioning if they received fax order for briefs/wipes   -Reyna Been states they did not get any faxes- requesting office re-fax orders   -ACM will send to PCP/office staff pool       Reason Patient is in Care coordination:   Diabetes, high fall risk, medication education       Chronic Conditions:   1.)Diabetes  -Not monitoring   -Diet controlled   Lab Results   Component Value Date    LABA1C 5.7 06/28/2019     No results found for: EAG  Assessment Completed:  Diabetes Assessment    Medic Alert ID:  No  Meal Planning:  Avoidance of concentrated sweets   How often do you test your blood sugar?:  No Testing   Do you have barriers with adherence to non-pharmacologic self-management interventions? (Nutrition/Exercise/Self-Monitoring):  Yes   Have you ever had to go to the ED for symptoms of low blood sugar?:  No       No patient-reported symptoms   Do you have hyperglycemia symptoms?:  No   Do you have hypoglycemia symptoms?:  No   Blood Sugar Monitoring Regimen:  Not Testing        Any ED visits or Hospitalizations since last Call?   · No      Medications [facility] in [place] per daughter request. Will follow up with Khole following PCP appointment. Any new orders/instructions? Care Coordination Interventions    Program Enrollment:  Rising Risk  Referral from Primary Care Provider:  No  Suggested Interventions and Community Resources  Fall Risk Prevention:  Completed  Home Health Services: In Process (Comment: [facility]- PT/OT/ST)  Meals on Wheels:  Declined  Medication Assistance Program:  Declined  Medi Set or Pill Pack:  Completed  Occupational Therapy: In Process (Comment: per home health care)  Physical Therapy: In Process (Comment: per home health care)  Transportation Support:  Completed  Zone Management Tools:  Completed (Comment: mailing Diabetes zone sheet on 2/20/2020)         Prior to Admission medications    Medication Sig Start Date End Date Taking?  Authorizing Provider   FLUoxetine (PROZAC) 20 MG capsule Take 20 mg by mouth daily    Historical Provider, MD   atorvastatin (LIPITOR) 20 MG tablet Take 1 tablet by mouth daily 2/10/20   Niru Aponte MD   spironolactone (ALDACTONE) 25 MG tablet Take 1 tablet by mouth daily 2/10/20   Niru Aponte MD   lisinopril (PRINIVIL;ZESTRIL) 20 MG tablet Take 1 tablet by mouth daily 1/28/20   [facility] ALEYDA Shell CNP   metoprolol tartrate (LOPRESSOR) 25 MG tablet Take 1 tablet by mouth 2 times daily 1/28/20   [facility] ALEYDA Shell CNP   diltiazem (CARDIZEM CD) 240 MG extended release capsule Take 1 capsule by mouth daily 1/29/20   [facility] ALEYDA Shell CNP   docusate sodium (COLACE) 100 MG capsule Take 1 capsule by mouth 2 times daily as needed for Constipation 1/28/20   [facility] ALEYDA Shell CNP   apixaban (ELIQUIS) 5 MG TABS tablet Take 5 mg by mouth 2 times daily    Historical Provider, MD   hydrALAZINE (APRESOLINE) 10 MG tablet Take 1 tablet by mouth 2 times daily as needed (90) Prn for bp >140 mm of hg 1/3/20   Jada Carias MD   acetaminophen (TYLENOL) 500 MG tablet

## 2020-03-12 ENCOUNTER — CARE COORDINATION (OUTPATIENT)
Dept: CARE COORDINATION | Age: 85
End: 2020-03-12

## 2020-03-12 NOTE — CARE COORDINATION
directed by my provider. I will keep my appointment or reschedule if I have to cancel. I will notify my provider of any barriers to my plan of care. I will follow my Zone Management tool to seek urgent or emergent care. I will notify my provider of any symptoms that indicate a worsening of my condition. Barriers: overwhelmed by complexity of regimen, stress and lack of education  Plan for overcoming my barriers: working with this ambulatory care manager  -Follow Zone sheet- DM   -Continue with [facility]- PT/OT/ST     Confidence: 7/10  Anticipated Goal Completion Date: 5/20/2020              Prior to Admission medications    Medication Sig Start Date End Date Taking?  Authorizing Provider   Incontinence Supply Disposable (DISPOSABLE BRIEF MEDIUM) MISC USE AS NEEDED FOR INCONTINENT EPISODES 2/27/20   Susy Zuluaga MD   Incontinence Supply Disposable (ALWAYS FEMININE WIPES) MISC USE AS NEEDED FOR INCONTINENT EPISODES 2/27/20   Susy Zuluaga MD   FLUoxetine (PROZAC) 20 MG capsule Take 20 mg by mouth daily    Historical Provider, MD   atorvastatin (LIPITOR) 20 MG tablet Take 1 tablet by mouth daily 2/10/20   Shefali Foster MD   spironolactone (ALDACTONE) 25 MG tablet Take 1 tablet by mouth daily 2/10/20   Shefali Foster MD   lisinopril (PRINIVIL;ZESTRIL) 20 MG tablet Take 1 tablet by mouth daily 1/28/20   ALEYDA Juarez CNP   metoprolol tartrate (LOPRESSOR) 25 MG tablet Take 1 tablet by mouth 2 times daily 1/28/20   ALEYDA Juarez CNP   diltiazem (CARDIZEM CD) 240 MG extended release capsule Take 1 capsule by mouth daily 1/29/20   ALEYDA Juarez CNP   docusate sodium (COLACE) 100 MG capsule Take 1 capsule by mouth 2 times daily as needed for Constipation 1/28/20   ALEYDA Juarez CNP   apixaban (ELIQUIS) 5 MG TABS tablet Take 5 mg by mouth 2 times daily    Historical Provider, MD   hydrALAZINE (APRESOLINE) 10 MG tablet Take 1 tablet by mouth 2 times daily

## 2020-03-20 ENCOUNTER — APPOINTMENT (OUTPATIENT)
Dept: CT IMAGING | Age: 85
DRG: 871 | End: 2020-03-20
Payer: MEDICARE

## 2020-03-20 ENCOUNTER — APPOINTMENT (OUTPATIENT)
Dept: GENERAL RADIOLOGY | Age: 85
DRG: 871 | End: 2020-03-20
Payer: MEDICARE

## 2020-03-20 ENCOUNTER — HOSPITAL ENCOUNTER (INPATIENT)
Age: 85
LOS: 5 days | Discharge: NURSING, REHAB OR LONG-TERM CARE | DRG: 871 | End: 2020-03-25
Attending: EMERGENCY MEDICINE | Admitting: FAMILY MEDICINE
Payer: MEDICARE

## 2020-03-20 PROBLEM — J90 PLEURAL EFFUSION, BILATERAL: Status: ACTIVE | Noted: 2020-03-20

## 2020-03-20 PROBLEM — A41.9 SEPTIC SHOCK (HCC): Status: ACTIVE | Noted: 2020-03-20

## 2020-03-20 PROBLEM — R65.21 SEPTIC SHOCK (HCC): Status: ACTIVE | Noted: 2020-03-20

## 2020-03-20 PROBLEM — J18.9 PNEUMONIA: Status: ACTIVE | Noted: 2020-03-20

## 2020-03-20 PROBLEM — R00.1 BRADYCARDIA: Status: ACTIVE | Noted: 2020-03-20

## 2020-03-20 LAB
-: ABNORMAL
ABSOLUTE EOS #: 0.07 K/UL (ref 0–0.44)
ABSOLUTE IMMATURE GRANULOCYTE: 0.07 K/UL (ref 0–0.3)
ABSOLUTE LYMPH #: 0.76 K/UL (ref 1.1–3.7)
ABSOLUTE MONO #: 0.53 K/UL (ref 0.1–1.2)
ALBUMIN SERPL-MCNC: 3.1 G/DL (ref 3.5–5.2)
ALBUMIN/GLOBULIN RATIO: 0.9 (ref 1–2.5)
ALP BLD-CCNC: 49 U/L (ref 35–104)
ALT SERPL-CCNC: 8 U/L (ref 5–33)
AMORPHOUS: ABNORMAL
ANION GAP SERPL CALCULATED.3IONS-SCNC: 11 MMOL/L (ref 9–17)
AST SERPL-CCNC: 19 U/L
BACTERIA: ABNORMAL
BASOPHILS # BLD: 1 % (ref 0–2)
BASOPHILS ABSOLUTE: 0.04 K/UL (ref 0–0.2)
BILIRUB SERPL-MCNC: 0.81 MG/DL (ref 0.3–1.2)
BILIRUBIN URINE: NEGATIVE
BNP INTERPRETATION: ABNORMAL
BUN BLDV-MCNC: 26 MG/DL (ref 8–23)
BUN/CREAT BLD: 23 (ref 9–20)
CALCIUM SERPL-MCNC: 8.5 MG/DL (ref 8.6–10.4)
CASTS UA: ABNORMAL /LPF
CHLORIDE BLD-SCNC: 97 MMOL/L (ref 98–107)
CO2: 20 MMOL/L (ref 20–31)
COLOR: YELLOW
COMMENT UA: ABNORMAL
CREAT SERPL-MCNC: 1.11 MG/DL (ref 0.5–0.9)
CRYSTALS, UA: ABNORMAL /HPF
DIFFERENTIAL TYPE: ABNORMAL
DIRECT EXAM: NORMAL
EKG ATRIAL RATE: 119 BPM
EKG Q-T INTERVAL: 522 MS
EKG QRS DURATION: 94 MS
EKG QTC CALCULATION (BAZETT): 489 MS
EKG R AXIS: -69 DEGREES
EKG T AXIS: 10 DEGREES
EKG VENTRICULAR RATE: 53 BPM
EOSINOPHILS RELATIVE PERCENT: 1 % (ref 1–4)
EPITHELIAL CELLS UA: ABNORMAL /HPF (ref 0–25)
GFR AFRICAN AMERICAN: 56 ML/MIN
GFR NON-AFRICAN AMERICAN: 46 ML/MIN
GFR SERPL CREATININE-BSD FRML MDRD: ABNORMAL ML/MIN/{1.73_M2}
GFR SERPL CREATININE-BSD FRML MDRD: ABNORMAL ML/MIN/{1.73_M2}
GLUCOSE BLD-MCNC: 121 MG/DL (ref 74–100)
GLUCOSE BLD-MCNC: 135 MG/DL (ref 74–100)
GLUCOSE BLD-MCNC: 203 MG/DL (ref 70–99)
GLUCOSE URINE: NEGATIVE
HCT VFR BLD CALC: 29.3 % (ref 36.3–47.1)
HEMOGLOBIN: 9.7 G/DL (ref 11.9–15.1)
IMMATURE GRANULOCYTES: 1 %
INR BLD: 1.3 (ref 0.9–1.2)
KETONES, URINE: NEGATIVE
LACTIC ACID, SEPSIS WHOLE BLOOD: ABNORMAL MMOL/L (ref 0.5–1.9)
LACTIC ACID, SEPSIS WHOLE BLOOD: NORMAL MMOL/L (ref 0.5–1.9)
LACTIC ACID, SEPSIS: 1.4 MMOL/L (ref 0.5–1.9)
LACTIC ACID, SEPSIS: 2 MMOL/L (ref 0.5–1.9)
LEUKOCYTE ESTERASE, URINE: NEGATIVE
LYMPHOCYTES # BLD: 11 % (ref 24–43)
Lab: NORMAL
MCH RBC QN AUTO: 33 PG (ref 25.2–33.5)
MCHC RBC AUTO-ENTMCNC: 33.1 G/DL (ref 28.4–34.8)
MCV RBC AUTO: 99.7 FL (ref 82.6–102.9)
MONOCYTES # BLD: 8 % (ref 3–12)
MUCUS: ABNORMAL
NITRITE, URINE: NEGATIVE
NRBC AUTOMATED: 0 PER 100 WBC
OTHER OBSERVATIONS UA: ABNORMAL
PARTIAL THROMBOPLASTIN TIME: 26.1 SEC (ref 23.2–34.4)
PDW BLD-RTO: 13.8 % (ref 11.8–14.4)
PH UA: 6.5 (ref 5–9)
PLATELET # BLD: 213 K/UL (ref 138–453)
PLATELET ESTIMATE: ABNORMAL
PMV BLD AUTO: 11.6 FL (ref 8.1–13.5)
POTASSIUM SERPL-SCNC: 4.9 MMOL/L (ref 3.7–5.3)
PRO-BNP: 3773 PG/ML
PROTEIN UA: NEGATIVE
PROTHROMBIN TIME: 12.9 SEC (ref 9.7–12.2)
RBC # BLD: 2.94 M/UL (ref 3.95–5.11)
RBC # BLD: ABNORMAL 10*6/UL
RBC UA: ABNORMAL /HPF (ref 0–2)
RENAL EPITHELIAL, UA: ABNORMAL /HPF
SEG NEUTROPHILS: 78 % (ref 36–65)
SEGMENTED NEUTROPHILS ABSOLUTE COUNT: 5.61 K/UL (ref 1.5–8.1)
SODIUM BLD-SCNC: 128 MMOL/L (ref 135–144)
SPECIFIC GRAVITY UA: 1.02 (ref 1.01–1.02)
SPECIMEN DESCRIPTION: NORMAL
TOTAL CK: 25 U/L (ref 26–192)
TOTAL PROTEIN: 6.5 G/DL (ref 6.4–8.3)
TRICHOMONAS: ABNORMAL
TROPONIN INTERP: ABNORMAL
TROPONIN T: ABNORMAL NG/ML
TROPONIN, HIGH SENSITIVITY: 31 NG/L (ref 0–14)
TSH SERPL DL<=0.05 MIU/L-ACNC: 2.5 MIU/L (ref 0.3–5)
TURBIDITY: CLEAR
URINE HGB: NEGATIVE
UROBILINOGEN, URINE: ABNORMAL
WBC # BLD: 7.1 K/UL (ref 3.5–11.3)
WBC # BLD: ABNORMAL 10*3/UL
WBC UA: ABNORMAL /HPF (ref 0–5)
YEAST: ABNORMAL

## 2020-03-20 PROCEDURE — 6360000002 HC RX W HCPCS: Performed by: FAMILY MEDICINE

## 2020-03-20 PROCEDURE — 2580000003 HC RX 258: Performed by: FAMILY MEDICINE

## 2020-03-20 PROCEDURE — 36415 COLL VENOUS BLD VENIPUNCTURE: CPT

## 2020-03-20 PROCEDURE — 70450 CT HEAD/BRAIN W/O DYE: CPT

## 2020-03-20 PROCEDURE — 85610 PROTHROMBIN TIME: CPT

## 2020-03-20 PROCEDURE — 71045 X-RAY EXAM CHEST 1 VIEW: CPT

## 2020-03-20 PROCEDURE — 2580000003 HC RX 258: Performed by: EMERGENCY MEDICINE

## 2020-03-20 PROCEDURE — 83605 ASSAY OF LACTIC ACID: CPT

## 2020-03-20 PROCEDURE — 6360000002 HC RX W HCPCS: Performed by: EMERGENCY MEDICINE

## 2020-03-20 PROCEDURE — 72125 CT NECK SPINE W/O DYE: CPT

## 2020-03-20 PROCEDURE — 87804 INFLUENZA ASSAY W/OPTIC: CPT

## 2020-03-20 PROCEDURE — 96375 TX/PRO/DX INJ NEW DRUG ADDON: CPT

## 2020-03-20 PROCEDURE — 82550 ASSAY OF CK (CPK): CPT

## 2020-03-20 PROCEDURE — 84443 ASSAY THYROID STIM HORMONE: CPT

## 2020-03-20 PROCEDURE — 85730 THROMBOPLASTIN TIME PARTIAL: CPT

## 2020-03-20 PROCEDURE — 81001 URINALYSIS AUTO W/SCOPE: CPT

## 2020-03-20 PROCEDURE — 6360000004 HC RX CONTRAST MEDICATION: Performed by: EMERGENCY MEDICINE

## 2020-03-20 PROCEDURE — 84484 ASSAY OF TROPONIN QUANT: CPT

## 2020-03-20 PROCEDURE — 83880 ASSAY OF NATRIURETIC PEPTIDE: CPT

## 2020-03-20 PROCEDURE — 93005 ELECTROCARDIOGRAM TRACING: CPT | Performed by: EMERGENCY MEDICINE

## 2020-03-20 PROCEDURE — 93010 ELECTROCARDIOGRAM REPORT: CPT | Performed by: INTERNAL MEDICINE

## 2020-03-20 PROCEDURE — 80053 COMPREHEN METABOLIC PANEL: CPT

## 2020-03-20 PROCEDURE — 85025 COMPLETE CBC W/AUTO DIFF WBC: CPT

## 2020-03-20 PROCEDURE — 99285 EMERGENCY DEPT VISIT HI MDM: CPT

## 2020-03-20 PROCEDURE — 2000000000 HC ICU R&B

## 2020-03-20 PROCEDURE — 82947 ASSAY GLUCOSE BLOOD QUANT: CPT

## 2020-03-20 PROCEDURE — 87040 BLOOD CULTURE FOR BACTERIA: CPT

## 2020-03-20 PROCEDURE — 74177 CT ABD & PELVIS W/CONTRAST: CPT

## 2020-03-20 PROCEDURE — 73502 X-RAY EXAM HIP UNI 2-3 VIEWS: CPT

## 2020-03-20 PROCEDURE — 87086 URINE CULTURE/COLONY COUNT: CPT

## 2020-03-20 PROCEDURE — 96365 THER/PROPH/DIAG IV INF INIT: CPT

## 2020-03-20 PROCEDURE — 6370000000 HC RX 637 (ALT 250 FOR IP): Performed by: FAMILY MEDICINE

## 2020-03-20 RX ORDER — 0.9 % SODIUM CHLORIDE 0.9 %
1000 INTRAVENOUS SOLUTION INTRAVENOUS ONCE
Status: COMPLETED | OUTPATIENT
Start: 2020-03-20 | End: 2020-03-20

## 2020-03-20 RX ORDER — FLUOXETINE HYDROCHLORIDE 20 MG/1
20 CAPSULE ORAL DAILY
Status: DISCONTINUED | OUTPATIENT
Start: 2020-03-20 | End: 2020-03-25 | Stop reason: HOSPADM

## 2020-03-20 RX ORDER — DEXTROSE MONOHYDRATE 25 G/50ML
12.5 INJECTION, SOLUTION INTRAVENOUS PRN
Status: DISCONTINUED | OUTPATIENT
Start: 2020-03-20 | End: 2020-03-25 | Stop reason: HOSPADM

## 2020-03-20 RX ORDER — SODIUM CHLORIDE 0.9 % (FLUSH) 0.9 %
10 SYRINGE (ML) INJECTION EVERY 12 HOURS SCHEDULED
Status: DISCONTINUED | OUTPATIENT
Start: 2020-03-20 | End: 2020-03-25 | Stop reason: HOSPADM

## 2020-03-20 RX ORDER — ATORVASTATIN CALCIUM 20 MG/1
20 TABLET, FILM COATED ORAL DAILY
Status: DISCONTINUED | OUTPATIENT
Start: 2020-03-20 | End: 2020-03-25 | Stop reason: HOSPADM

## 2020-03-20 RX ORDER — SPIRONOLACTONE 25 MG/1
25 TABLET ORAL DAILY
Status: DISCONTINUED | OUTPATIENT
Start: 2020-03-20 | End: 2020-03-25 | Stop reason: HOSPADM

## 2020-03-20 RX ORDER — LISINOPRIL 20 MG/1
20 TABLET ORAL DAILY
Status: DISCONTINUED | OUTPATIENT
Start: 2020-03-20 | End: 2020-03-21

## 2020-03-20 RX ORDER — DEXTROSE MONOHYDRATE 50 MG/ML
100 INJECTION, SOLUTION INTRAVENOUS PRN
Status: DISCONTINUED | OUTPATIENT
Start: 2020-03-20 | End: 2020-03-25 | Stop reason: HOSPADM

## 2020-03-20 RX ORDER — 0.9 % SODIUM CHLORIDE 0.9 %
500 INTRAVENOUS SOLUTION INTRAVENOUS ONCE
Status: DISCONTINUED | OUTPATIENT
Start: 2020-03-20 | End: 2020-03-23

## 2020-03-20 RX ORDER — SODIUM CHLORIDE 0.9 % (FLUSH) 0.9 %
10 SYRINGE (ML) INJECTION PRN
Status: DISCONTINUED | OUTPATIENT
Start: 2020-03-20 | End: 2020-03-25 | Stop reason: HOSPADM

## 2020-03-20 RX ORDER — SODIUM CHLORIDE 9 MG/ML
INJECTION, SOLUTION INTRAVENOUS CONTINUOUS
Status: DISCONTINUED | OUTPATIENT
Start: 2020-03-20 | End: 2020-03-23

## 2020-03-20 RX ORDER — NICOTINE POLACRILEX 4 MG
15 LOZENGE BUCCAL PRN
Status: DISCONTINUED | OUTPATIENT
Start: 2020-03-20 | End: 2020-03-25 | Stop reason: HOSPADM

## 2020-03-20 RX ORDER — ACETAMINOPHEN 500 MG
500 TABLET ORAL EVERY 4 HOURS PRN
Status: DISCONTINUED | OUTPATIENT
Start: 2020-03-20 | End: 2020-03-25 | Stop reason: HOSPADM

## 2020-03-20 RX ORDER — ATROPINE SULFATE 0.1 MG/ML
0.5 INJECTION INTRAVENOUS ONCE
Status: COMPLETED | OUTPATIENT
Start: 2020-03-20 | End: 2020-03-20

## 2020-03-20 RX ORDER — DOCUSATE SODIUM 100 MG/1
100 CAPSULE, LIQUID FILLED ORAL 2 TIMES DAILY PRN
Status: DISCONTINUED | OUTPATIENT
Start: 2020-03-20 | End: 2020-03-25 | Stop reason: HOSPADM

## 2020-03-20 RX ADMIN — METOPROLOL TARTRATE 25 MG: 25 TABLET, FILM COATED ORAL at 21:24

## 2020-03-20 RX ADMIN — ATROPINE SULFATE 0.5 MG: 0.1 INJECTION, SOLUTION INTRAVENOUS at 11:48

## 2020-03-20 RX ADMIN — SODIUM CHLORIDE 1000 ML: 9 INJECTION, SOLUTION INTRAVENOUS at 11:28

## 2020-03-20 RX ADMIN — PIPERACILLIN AND TAZOBACTAM 3.38 G: 3; .375 INJECTION, POWDER, FOR SOLUTION INTRAVENOUS at 13:39

## 2020-03-20 RX ADMIN — ATORVASTATIN CALCIUM 20 MG: 20 TABLET, FILM COATED ORAL at 21:24

## 2020-03-20 RX ADMIN — SPIRONOLACTONE 25 MG: 25 TABLET, FILM COATED ORAL at 17:06

## 2020-03-20 RX ADMIN — FLUOXETINE 20 MG: 20 CAPSULE ORAL at 17:06

## 2020-03-20 RX ADMIN — PIPERACILLIN SODIUM,TAZOBACTAM SODIUM 3.38 G: 3; .375 INJECTION, POWDER, FOR SOLUTION INTRAVENOUS at 21:22

## 2020-03-20 RX ADMIN — SODIUM CHLORIDE: 9 INJECTION, SOLUTION INTRAVENOUS at 17:08

## 2020-03-20 RX ADMIN — APIXABAN 2.5 MG: 2.5 TABLET, FILM COATED ORAL at 21:24

## 2020-03-20 RX ADMIN — IOPAMIDOL 75 ML: 755 INJECTION, SOLUTION INTRAVENOUS at 12:43

## 2020-03-20 NOTE — H&P
paranasal sinuses and mastoid air cells demonstrate no acute abnormality. SOFT TISSUES/SKULL:  No acute abnormality of the visualized skull or soft tissues. Cerebral atrophy and remote left frontal lobe infarct without acute intracranial abnormality. Ct Cervical Spine Wo Contrast    Result Date: 3/20/2020  EXAMINATION: CT OF THE CERVICAL SPINE WITHOUT CONTRAST 3/20/2020 12:28 pm TECHNIQUE: CT of the cervical spine was performed without the administration of intravenous contrast. Multiplanar reformatted images are provided for review. Dose modulation, iterative reconstruction, and/or weight based adjustment of the mA/kV was utilized to reduce the radiation dose to as low as reasonably achievable. COMPARISON: None. HISTORY: ORDERING SYSTEM PROVIDED HISTORY: trauma TECHNOLOGIST PROVIDED HISTORY: trauma FINDINGS: BONES/ALIGNMENT: There is no acute fracture or traumatic malalignment. Well-circumscribed 5 mm cyst at the odontoid process neck is typical of sequela from degenerative change. Grade 1 degenerative anterolisthesis T1 on T2. DEGENERATIVE CHANGES: Diffuse mild-to-moderate degenerative changes throughout the cervical spine. There appears to be posterior disc osteophyte complex narrowing the spinal canal at C6-7 to a measurement of 8 mm AP dimension. Facet spondylosis posteriorly and posterior disc bulge at C3-4 narrows the spinal canal to measurement of 7 mm AP dimension. No other cervical spinal canal stenosis or significant neural foraminal narrowing is demonstrated. Adron Remington SOFT TISSUES: There is no prevertebral soft tissue swelling. Nonspecific small volume right pleural effusion. There are 3 solid soft tissue nodules measuring 4 mm or less upper right lung. 1. No acute abnormality of the cervical spine.  2. Diffuse mild-to-moderate degenerative changes cervical spine as expected given the patient's age, without evidence of mild-to-moderate canal stenosis C3-4 and C6-7. 3. Degenerative grade 1 reviewed: Yes  EKG Reviewed:  Yes  Advance Directive Addressed: Yes  Total time spent in evaluating this patient and formulating plan of care 45 minutes  Shawn Estrada MD  3/20/2020, 4:16 PM

## 2020-03-20 NOTE — ED PROVIDER NOTES
General: She is not in acute distress. Appearance: She is well-developed. She is not ill-appearing, toxic-appearing or diaphoretic. Comments: Awake but confused   HENT:      Head: Normocephalic and atraumatic. Eyes:      General: No scleral icterus. Conjunctiva/sclera: Conjunctivae normal.      Right eye: Right conjunctiva is not injected. Left eye: Left conjunctiva is not injected. Pupils: Pupils are equal, round, and reactive to light. Neck:      Musculoskeletal: Normal range of motion and neck supple. Thyroid: No thyromegaly. Trachea: No tracheal deviation. Cardiovascular:      Rate and Rhythm: Bradycardia present. Rhythm irregular. Heart sounds: Normal heart sounds. No murmur. No friction rub. No gallop. Pulmonary:      Effort: Pulmonary effort is normal. No respiratory distress. Breath sounds: Normal breath sounds. No stridor. No wheezing or rales. Abdominal:      General: Bowel sounds are normal. There is no distension. Palpations: Abdomen is soft. There is no mass. Tenderness: There is no abdominal tenderness. There is no guarding or rebound. Comments: Negative Rick's sign  Nontender McBurney's Point  Negative Rovsig's sign  No bruising or echymosis of abdomen   Musculoskeletal:         General: No tenderness. Comments: Echymosis right hip and upper right thigh      Negative Blaise's Sign bilaterally   Lymphadenopathy:      Cervical: No cervical adenopathy. Skin:     General: Skin is warm and dry. Coloration: Skin is not pale. Findings: No erythema or rash. Neurological:      Cranial Nerves: No cranial nerve deficit. Motor: No abnormal muscle tone. Coordination: Coordination normal.      Comments: Pleasantly confused  R sided facial droo  R arm weakness    No nystagmus   Psychiatric:         Behavior: Behavior normal.         Thought Content:  Thought content normal.         DIAGNOSTIC RESULTS     EKG:(none

## 2020-03-21 LAB
ABSOLUTE EOS #: 0.03 K/UL (ref 0–0.44)
ABSOLUTE IMMATURE GRANULOCYTE: 0.04 K/UL (ref 0–0.3)
ABSOLUTE LYMPH #: 0.7 K/UL (ref 1.1–3.7)
ABSOLUTE MONO #: 0.54 K/UL (ref 0.1–1.2)
ALBUMIN SERPL-MCNC: 3.2 G/DL (ref 3.5–5.2)
ALBUMIN/GLOBULIN RATIO: 0.9 (ref 1–2.5)
ALP BLD-CCNC: 54 U/L (ref 35–104)
ALT SERPL-CCNC: 10 U/L (ref 5–33)
ANION GAP SERPL CALCULATED.3IONS-SCNC: 10 MMOL/L (ref 9–17)
ANION GAP SERPL CALCULATED.3IONS-SCNC: 10 MMOL/L (ref 9–17)
AST SERPL-CCNC: 18 U/L
BASOPHILS # BLD: 0 % (ref 0–2)
BASOPHILS ABSOLUTE: <0.03 K/UL (ref 0–0.2)
BILIRUB SERPL-MCNC: 0.76 MG/DL (ref 0.3–1.2)
BUN BLDV-MCNC: 23 MG/DL (ref 8–23)
BUN BLDV-MCNC: 23 MG/DL (ref 8–23)
BUN/CREAT BLD: 26 (ref 9–20)
BUN/CREAT BLD: 27 (ref 9–20)
CALCIUM SERPL-MCNC: 8.6 MG/DL (ref 8.6–10.4)
CALCIUM SERPL-MCNC: 8.8 MG/DL (ref 8.6–10.4)
CHLORIDE BLD-SCNC: 93 MMOL/L (ref 98–107)
CHLORIDE BLD-SCNC: 98 MMOL/L (ref 98–107)
CO2: 20 MMOL/L (ref 20–31)
CO2: 21 MMOL/L (ref 20–31)
CREAT SERPL-MCNC: 0.85 MG/DL (ref 0.5–0.9)
CREAT SERPL-MCNC: 0.88 MG/DL (ref 0.5–0.9)
CULTURE: NO GROWTH
DIFFERENTIAL TYPE: ABNORMAL
EOSINOPHILS RELATIVE PERCENT: 0 % (ref 1–4)
GFR AFRICAN AMERICAN: >60 ML/MIN
GFR AFRICAN AMERICAN: >60 ML/MIN
GFR NON-AFRICAN AMERICAN: >60 ML/MIN
GFR NON-AFRICAN AMERICAN: >60 ML/MIN
GFR SERPL CREATININE-BSD FRML MDRD: ABNORMAL ML/MIN/{1.73_M2}
GLUCOSE BLD-MCNC: 113 MG/DL (ref 74–100)
GLUCOSE BLD-MCNC: 124 MG/DL (ref 70–99)
GLUCOSE BLD-MCNC: 134 MG/DL (ref 74–100)
GLUCOSE BLD-MCNC: 139 MG/DL (ref 70–99)
GLUCOSE BLD-MCNC: 147 MG/DL (ref 74–100)
GLUCOSE BLD-MCNC: 152 MG/DL (ref 74–100)
HCT VFR BLD CALC: 30.2 % (ref 36.3–47.1)
HEMOGLOBIN: 10 G/DL (ref 11.9–15.1)
IMMATURE GRANULOCYTES: 1 %
LYMPHOCYTES # BLD: 8 % (ref 24–43)
Lab: NORMAL
MAGNESIUM: 1.7 MG/DL (ref 1.6–2.6)
MCH RBC QN AUTO: 32.7 PG (ref 25.2–33.5)
MCHC RBC AUTO-ENTMCNC: 33.1 G/DL (ref 28.4–34.8)
MCV RBC AUTO: 98.7 FL (ref 82.6–102.9)
MONOCYTES # BLD: 7 % (ref 3–12)
NRBC AUTOMATED: 0 PER 100 WBC
PDW BLD-RTO: 13.7 % (ref 11.8–14.4)
PLATELET # BLD: 196 K/UL (ref 138–453)
PLATELET ESTIMATE: ABNORMAL
PMV BLD AUTO: 10.9 FL (ref 8.1–13.5)
POTASSIUM SERPL-SCNC: 4.1 MMOL/L (ref 3.7–5.3)
POTASSIUM SERPL-SCNC: 4.8 MMOL/L (ref 3.7–5.3)
RBC # BLD: 3.06 M/UL (ref 3.95–5.11)
RBC # BLD: ABNORMAL 10*6/UL
SEG NEUTROPHILS: 84 % (ref 36–65)
SEGMENTED NEUTROPHILS ABSOLUTE COUNT: 7 K/UL (ref 1.5–8.1)
SODIUM BLD-SCNC: 123 MMOL/L (ref 135–144)
SODIUM BLD-SCNC: 129 MMOL/L (ref 135–144)
SPECIMEN DESCRIPTION: NORMAL
TOTAL PROTEIN: 6.8 G/DL (ref 6.4–8.3)
WBC # BLD: 8.3 K/UL (ref 3.5–11.3)
WBC # BLD: ABNORMAL 10*3/UL

## 2020-03-21 PROCEDURE — 80048 BASIC METABOLIC PNL TOTAL CA: CPT

## 2020-03-21 PROCEDURE — 6370000000 HC RX 637 (ALT 250 FOR IP): Performed by: FAMILY MEDICINE

## 2020-03-21 PROCEDURE — 85025 COMPLETE CBC W/AUTO DIFF WBC: CPT

## 2020-03-21 PROCEDURE — 2580000003 HC RX 258: Performed by: FAMILY MEDICINE

## 2020-03-21 PROCEDURE — 80053 COMPREHEN METABOLIC PANEL: CPT

## 2020-03-21 PROCEDURE — 36415 COLL VENOUS BLD VENIPUNCTURE: CPT

## 2020-03-21 PROCEDURE — 2580000003 HC RX 258: Performed by: INTERNAL MEDICINE

## 2020-03-21 PROCEDURE — 6360000002 HC RX W HCPCS: Performed by: FAMILY MEDICINE

## 2020-03-21 PROCEDURE — 2000000000 HC ICU R&B

## 2020-03-21 PROCEDURE — 83735 ASSAY OF MAGNESIUM: CPT

## 2020-03-21 PROCEDURE — 82947 ASSAY GLUCOSE BLOOD QUANT: CPT

## 2020-03-21 RX ADMIN — PIPERACILLIN SODIUM,TAZOBACTAM SODIUM 3.38 G: 3; .375 INJECTION, POWDER, FOR SOLUTION INTRAVENOUS at 04:30

## 2020-03-21 RX ADMIN — PIPERACILLIN SODIUM,TAZOBACTAM SODIUM 3.38 G: 3; .375 INJECTION, POWDER, FOR SOLUTION INTRAVENOUS at 20:51

## 2020-03-21 RX ADMIN — INSULIN LISPRO 1 UNITS: 100 INJECTION, SOLUTION INTRAVENOUS; SUBCUTANEOUS at 12:17

## 2020-03-21 RX ADMIN — APIXABAN 2.5 MG: 2.5 TABLET, FILM COATED ORAL at 20:50

## 2020-03-21 RX ADMIN — SODIUM CHLORIDE: 9 INJECTION, SOLUTION INTRAVENOUS at 22:50

## 2020-03-21 RX ADMIN — SODIUM CHLORIDE: 9 INJECTION, SOLUTION INTRAVENOUS at 09:04

## 2020-03-21 RX ADMIN — APIXABAN 2.5 MG: 2.5 TABLET, FILM COATED ORAL at 08:45

## 2020-03-21 RX ADMIN — SPIRONOLACTONE 25 MG: 25 TABLET, FILM COATED ORAL at 08:45

## 2020-03-21 RX ADMIN — PIPERACILLIN SODIUM,TAZOBACTAM SODIUM 3.38 G: 3; .375 INJECTION, POWDER, FOR SOLUTION INTRAVENOUS at 13:03

## 2020-03-21 RX ADMIN — METOPROLOL TARTRATE 25 MG: 25 TABLET, FILM COATED ORAL at 20:50

## 2020-03-21 RX ADMIN — FLUOXETINE 20 MG: 20 CAPSULE ORAL at 08:45

## 2020-03-21 RX ADMIN — INSULIN LISPRO 1 UNITS: 100 INJECTION, SOLUTION INTRAVENOUS; SUBCUTANEOUS at 20:33

## 2020-03-21 RX ADMIN — METOPROLOL TARTRATE 25 MG: 25 TABLET, FILM COATED ORAL at 08:45

## 2020-03-21 RX ADMIN — ATORVASTATIN CALCIUM 20 MG: 20 TABLET, FILM COATED ORAL at 20:50

## 2020-03-21 NOTE — PROGRESS NOTES
Assessment completed. Patient has hx of stroke with right sided weakness, and garbled speech. Mouth spray used for mouth. Patient assisted in turning with assessment. Unable to understand patient. Checked patient's brief, small amount of urine noted with a scant smear of BM noted. Area cleansed and new brief applied.   Continue to monitor

## 2020-03-21 NOTE — PROGRESS NOTES
12:28 pm TECHNIQUE: CT of the cervical spine was performed without the administration of intravenous contrast. Multiplanar reformatted images are provided for review. Dose modulation, iterative reconstruction, and/or weight based adjustment of the mA/kV was utilized to reduce the radiation dose to as low as reasonably achievable. COMPARISON: None. HISTORY: ORDERING SYSTEM PROVIDED HISTORY: trauma TECHNOLOGIST PROVIDED HISTORY: trauma FINDINGS: BONES/ALIGNMENT: There is no acute fracture or traumatic malalignment. Well-circumscribed 5 mm cyst at the odontoid process neck is typical of sequela from degenerative change. Grade 1 degenerative anterolisthesis T1 on T2. DEGENERATIVE CHANGES: Diffuse mild-to-moderate degenerative changes throughout the cervical spine. There appears to be posterior disc osteophyte complex narrowing the spinal canal at C6-7 to a measurement of 8 mm AP dimension. Facet spondylosis posteriorly and posterior disc bulge at C3-4 narrows the spinal canal to measurement of 7 mm AP dimension. No other cervical spinal canal stenosis or significant neural foraminal narrowing is demonstrated. Shiela Budd SOFT TISSUES: There is no prevertebral soft tissue swelling. Nonspecific small volume right pleural effusion. There are 3 solid soft tissue nodules measuring 4 mm or less upper right lung. 1. No acute abnormality of the cervical spine. 2. Diffuse mild-to-moderate degenerative changes cervical spine as expected given the patient's age, without evidence of mild-to-moderate canal stenosis C3-4 and C6-7. 3. Degenerative grade 1 anterolisthesis T1 on T2. 4. Right pleural effusion. 5. Multiple pulmonary nodules. Most severe: 4.0 mm solid pulmonary nodule detected on incomplete chest CT. No routine follow-up imaging is recommended. These guidelines do not apply to immunocompromised patients and patients with cancer. Follow up in patients with significant comorbidities as clinically warranted.  For lung cancer

## 2020-03-21 NOTE — PROGRESS NOTES
Assessment completed. Mouth swabbed with oral swab d/t foul smell. Patient assisted with repositioning. Brief remains dry. After repositioning and swabbing mouth, patient then has small emesis of green liquid. When asked if patient is ok afterwards, patient does state \"yes\". Monitored patient for few minutes without any emesis. Respirations unlabored. Monitor continues to show Atrial fib in the 90's.   Continue to monitor

## 2020-03-22 LAB
ABSOLUTE EOS #: 0.08 K/UL (ref 0–0.44)
ABSOLUTE IMMATURE GRANULOCYTE: 0 K/UL (ref 0–0.3)
ABSOLUTE LYMPH #: 1.31 K/UL (ref 1.1–3.7)
ABSOLUTE MONO #: 0.25 K/UL (ref 0.1–1.2)
ALBUMIN SERPL-MCNC: 3.3 G/DL (ref 3.5–5.2)
ALBUMIN/GLOBULIN RATIO: 1 (ref 1–2.5)
ALP BLD-CCNC: 51 U/L (ref 35–104)
ALT SERPL-CCNC: 10 U/L (ref 5–33)
ANION GAP SERPL CALCULATED.3IONS-SCNC: 12 MMOL/L (ref 9–17)
AST SERPL-CCNC: 20 U/L
BASOPHILS # BLD: 0 % (ref 0–2)
BASOPHILS ABSOLUTE: 0 K/UL (ref 0–0.2)
BILIRUB SERPL-MCNC: 0.86 MG/DL (ref 0.3–1.2)
BUN BLDV-MCNC: 20 MG/DL (ref 8–23)
BUN/CREAT BLD: 22 (ref 9–20)
CALCIUM SERPL-MCNC: 8.7 MG/DL (ref 8.6–10.4)
CHLORIDE BLD-SCNC: 97 MMOL/L (ref 98–107)
CO2: 20 MMOL/L (ref 20–31)
CREAT SERPL-MCNC: 0.91 MG/DL (ref 0.5–0.9)
DIFFERENTIAL TYPE: ABNORMAL
EOSINOPHILS RELATIVE PERCENT: 1 % (ref 1–4)
GFR AFRICAN AMERICAN: >60 ML/MIN
GFR NON-AFRICAN AMERICAN: 58 ML/MIN
GFR SERPL CREATININE-BSD FRML MDRD: ABNORMAL ML/MIN/{1.73_M2}
GFR SERPL CREATININE-BSD FRML MDRD: ABNORMAL ML/MIN/{1.73_M2}
GLUCOSE BLD-MCNC: 109 MG/DL (ref 74–100)
GLUCOSE BLD-MCNC: 112 MG/DL (ref 74–100)
GLUCOSE BLD-MCNC: 135 MG/DL (ref 70–99)
GLUCOSE BLD-MCNC: 142 MG/DL (ref 74–100)
GLUCOSE BLD-MCNC: 93 MG/DL (ref 74–100)
HCT VFR BLD CALC: 29.8 % (ref 36.3–47.1)
HEMOGLOBIN: 9.7 G/DL (ref 11.9–15.1)
IMMATURE GRANULOCYTES: 0 %
LYMPHOCYTES # BLD: 16 % (ref 24–43)
MAGNESIUM: 1.5 MG/DL (ref 1.6–2.6)
MCH RBC QN AUTO: 32.9 PG (ref 25.2–33.5)
MCHC RBC AUTO-ENTMCNC: 32.6 G/DL (ref 28.4–34.8)
MCV RBC AUTO: 101 FL (ref 82.6–102.9)
MONOCYTES # BLD: 3 % (ref 3–12)
MORPHOLOGY: NORMAL
NRBC AUTOMATED: 0 PER 100 WBC
PDW BLD-RTO: 14 % (ref 11.8–14.4)
PLATELET # BLD: 197 K/UL (ref 138–453)
PLATELET ESTIMATE: ABNORMAL
PMV BLD AUTO: 11.1 FL (ref 8.1–13.5)
POTASSIUM SERPL-SCNC: 4 MMOL/L (ref 3.7–5.3)
RBC # BLD: 2.95 M/UL (ref 3.95–5.11)
RBC # BLD: ABNORMAL 10*6/UL
SEG NEUTROPHILS: 80 % (ref 36–65)
SEGMENTED NEUTROPHILS ABSOLUTE COUNT: 6.56 K/UL (ref 1.5–8.1)
SODIUM BLD-SCNC: 129 MMOL/L (ref 135–144)
TOTAL PROTEIN: 6.7 G/DL (ref 6.4–8.3)
WBC # BLD: 8.2 K/UL (ref 3.5–11.3)
WBC # BLD: ABNORMAL 10*3/UL

## 2020-03-22 PROCEDURE — 85025 COMPLETE CBC W/AUTO DIFF WBC: CPT

## 2020-03-22 PROCEDURE — 83735 ASSAY OF MAGNESIUM: CPT

## 2020-03-22 PROCEDURE — 2580000003 HC RX 258: Performed by: FAMILY MEDICINE

## 2020-03-22 PROCEDURE — 82947 ASSAY GLUCOSE BLOOD QUANT: CPT

## 2020-03-22 PROCEDURE — 2000000000 HC ICU R&B

## 2020-03-22 PROCEDURE — 6370000000 HC RX 637 (ALT 250 FOR IP): Performed by: FAMILY MEDICINE

## 2020-03-22 PROCEDURE — 6370000000 HC RX 637 (ALT 250 FOR IP): Performed by: INTERNAL MEDICINE

## 2020-03-22 PROCEDURE — 36415 COLL VENOUS BLD VENIPUNCTURE: CPT

## 2020-03-22 PROCEDURE — 6360000002 HC RX W HCPCS: Performed by: FAMILY MEDICINE

## 2020-03-22 PROCEDURE — 2580000003 HC RX 258: Performed by: INTERNAL MEDICINE

## 2020-03-22 PROCEDURE — 80053 COMPREHEN METABOLIC PANEL: CPT

## 2020-03-22 RX ORDER — METOPROLOL TARTRATE 50 MG/1
50 TABLET, FILM COATED ORAL 2 TIMES DAILY
Status: DISCONTINUED | OUTPATIENT
Start: 2020-03-22 | End: 2020-03-25 | Stop reason: HOSPADM

## 2020-03-22 RX ADMIN — PIPERACILLIN SODIUM,TAZOBACTAM SODIUM 3.38 G: 3; .375 INJECTION, POWDER, FOR SOLUTION INTRAVENOUS at 05:02

## 2020-03-22 RX ADMIN — SPIRONOLACTONE 25 MG: 25 TABLET, FILM COATED ORAL at 08:45

## 2020-03-22 RX ADMIN — INSULIN LISPRO 1 UNITS: 100 INJECTION, SOLUTION INTRAVENOUS; SUBCUTANEOUS at 12:04

## 2020-03-22 RX ADMIN — METOPROLOL TARTRATE 25 MG: 25 TABLET, FILM COATED ORAL at 08:45

## 2020-03-22 RX ADMIN — ATORVASTATIN CALCIUM 20 MG: 20 TABLET, FILM COATED ORAL at 20:45

## 2020-03-22 RX ADMIN — PIPERACILLIN SODIUM,TAZOBACTAM SODIUM 3.38 G: 3; .375 INJECTION, POWDER, FOR SOLUTION INTRAVENOUS at 13:23

## 2020-03-22 RX ADMIN — APIXABAN 2.5 MG: 2.5 TABLET, FILM COATED ORAL at 08:45

## 2020-03-22 RX ADMIN — FLUOXETINE 20 MG: 20 CAPSULE ORAL at 08:45

## 2020-03-22 RX ADMIN — METOPROLOL TARTRATE 50 MG: 50 TABLET, FILM COATED ORAL at 20:45

## 2020-03-22 RX ADMIN — PIPERACILLIN SODIUM,TAZOBACTAM SODIUM 3.38 G: 3; .375 INJECTION, POWDER, FOR SOLUTION INTRAVENOUS at 20:45

## 2020-03-22 RX ADMIN — SODIUM CHLORIDE: 9 INJECTION, SOLUTION INTRAVENOUS at 11:45

## 2020-03-22 RX ADMIN — APIXABAN 2.5 MG: 2.5 TABLET, FILM COATED ORAL at 20:44

## 2020-03-22 RX ADMIN — METOPROLOL TARTRATE 25 MG: 25 TABLET, FILM COATED ORAL at 10:32

## 2020-03-22 NOTE — PROGRESS NOTES
3/20/2020 12:44 pm COMPARISON: None. HISTORY: ORDERING SYSTEM PROVIDED HISTORY: Pain TECHNOLOGIST PROVIDED HISTORY: Pain Alter mental status. FINDINGS: The femoral head is well circumscribed and situated within the acetabulum. Mild to moderate arthritic changes are present in the right hip with medial inferior joint space narrowing and mild marginal acetabular spurring. No acute fracture, dislocation, or effusion is noted. Degenerative changes are present in the symphysis pubis and right SI joint. Vascular calcification right femoral artery is noted. Degenerative changes. No acute osseous abnormality. Ct Head Wo Contrast    Result Date: 3/20/2020  EXAMINATION: CT OF THE HEAD WITHOUT CONTRAST  3/20/2020 12:28 pm TECHNIQUE: CT of the head was performed without the administration of intravenous contrast. Dose modulation, iterative reconstruction, and/or weight based adjustment of the mA/kV was utilized to reduce the radiation dose to as low as reasonably achievable. COMPARISON: CT brain performed 11/23/2019. HISTORY: ORDERING SYSTEM PROVIDED HISTORY: fall, altered, hypotensive TECHNOLOGIST PROVIDED HISTORY: fall, altered, hypotensive FINDINGS: BRAIN/VENTRICLES: There is no acute intracranial hemorrhage, mass effect, or midline shift. There is satisfactory overall gray-white matter differentiation. There is cerebral atrophy. There is a remote left frontal lobe infarct. The ventricular structures are symmetric and unremarkable. The infratentorial structures are unremarkable. ORBITS: The visualized portion of the orbits demonstrate no acute abnormality. SINUSES: The visualized paranasal sinuses and mastoid air cells demonstrate no acute abnormality. SOFT TISSUES/SKULL:  No acute abnormality of the visualized skull or soft tissues. Cerebral atrophy and remote left frontal lobe infarct without acute intracranial abnormality.      Ct Cervical Spine Wo Contrast    Result Date: 3/20/2020  EXAMINATION: CT OF of sequela from old granulomatous disease. No pneumothorax. Soft Tissues/Bones: No acute superficial soft tissue or osseous structure abnormality evident. Abdomen/Pelvis: Organs: The liver attenuation and texture appears unremarkable. No discrete hepatic lesion or intrahepatic bile duct dilatation is seen. The gallbladder has calcifications reflecting cholelithiasis without pericholecystic fluid or inflammatory change evident. The kidneys, spleen, adrenal glands and pancreas appear unremarkable. Stable benign left adrenal adenoma requires no additional evaluation or follow-up, 2 cm axial series 5, image 26 as seen on prior study in 2009. GI/Bowel: No diffuse or focal bowel wall thickening evident. No inflammatory changes evident. No obstruction is seen. The appendix is visualized right lower quadrant, unremarkable in appearance. Pelvis: The uterus and adnexal structures appear unremarkable. Urinary bladder is partially filled, unremarkable appearance. No adenopathy or free fluid. Peritoneum/Retroperitoneum: 8 mm splenic artery aneurysm demonstrated, densely calcified. Sequela from ventral herniorrhaphy. Calcific atherosclerotic disease aorta. No aneurysm. Unremarkable appearance of the IVC. No adenopathy or fluid. Bones/Soft Tissues: No acute superficial soft tissue or osseous structure abnormality evident. Degenerative changes lumbar spine with grade 1 degenerative anterolisthesis L4 on L5. No fracture evident. 1. CT CHEST: Moderate volume right pleural effusion and small volume left pleural effusion with bibasilar subsegmental atelectasis or pneumonia. 2. Mild calcific atherosclerosis aorta and coronary arteries. 3. No acute traumatic abnormality is evident. 4. CT ABDOMEN/PELVIS: No CT evidence of an acute intra-abdominal or intrapelvic process. 5. No acute traumatic abnormality evident. 6. Cholelithiasis without definite CT findings of acute cholecystitis.  7. Densely calcified 8 mm splenic

## 2020-03-23 ENCOUNTER — APPOINTMENT (OUTPATIENT)
Dept: GENERAL RADIOLOGY | Age: 85
DRG: 871 | End: 2020-03-23
Payer: MEDICARE

## 2020-03-23 ENCOUNTER — CARE COORDINATION (OUTPATIENT)
Dept: CARE COORDINATION | Age: 85
End: 2020-03-23

## 2020-03-23 LAB
ABSOLUTE EOS #: 0.08 K/UL (ref 0–0.44)
ABSOLUTE IMMATURE GRANULOCYTE: ABNORMAL K/UL (ref 0–0.3)
ABSOLUTE LYMPH #: 1.21 K/UL (ref 1.1–3.7)
ABSOLUTE MONO #: 0.66 K/UL (ref 0.1–1.2)
ALBUMIN SERPL-MCNC: 3 G/DL (ref 3.5–5.2)
ALBUMIN/GLOBULIN RATIO: 0.9 (ref 1–2.5)
ALP BLD-CCNC: 48 U/L (ref 35–104)
ALT SERPL-CCNC: 13 U/L (ref 5–33)
ANION GAP SERPL CALCULATED.3IONS-SCNC: 9 MMOL/L (ref 9–17)
AST SERPL-CCNC: 21 U/L
BASOPHILS # BLD: 1 % (ref 0–2)
BASOPHILS ABSOLUTE: 0.03 K/UL (ref 0–0.2)
BILIRUB SERPL-MCNC: 0.76 MG/DL (ref 0.3–1.2)
BUN BLDV-MCNC: 18 MG/DL (ref 8–23)
BUN/CREAT BLD: 20 (ref 9–20)
CALCIUM SERPL-MCNC: 8.4 MG/DL (ref 8.6–10.4)
CHLORIDE BLD-SCNC: 101 MMOL/L (ref 98–107)
CO2: 20 MMOL/L (ref 20–31)
CREAT SERPL-MCNC: 0.89 MG/DL (ref 0.5–0.9)
DIFFERENTIAL TYPE: ABNORMAL
EKG ATRIAL RATE: 70 BPM
EKG Q-T INTERVAL: 366 MS
EKG QRS DURATION: 90 MS
EKG QTC CALCULATION (BAZETT): 481 MS
EKG R AXIS: -73 DEGREES
EKG T AXIS: 41 DEGREES
EKG VENTRICULAR RATE: 104 BPM
EOSINOPHILS RELATIVE PERCENT: 1 % (ref 1–4)
GFR AFRICAN AMERICAN: >60 ML/MIN
GFR NON-AFRICAN AMERICAN: >60 ML/MIN
GFR SERPL CREATININE-BSD FRML MDRD: ABNORMAL ML/MIN/{1.73_M2}
GFR SERPL CREATININE-BSD FRML MDRD: ABNORMAL ML/MIN/{1.73_M2}
GLUCOSE BLD-MCNC: 101 MG/DL (ref 74–100)
GLUCOSE BLD-MCNC: 110 MG/DL (ref 70–99)
GLUCOSE BLD-MCNC: 118 MG/DL (ref 74–100)
GLUCOSE BLD-MCNC: 99 MG/DL (ref 74–100)
GLUCOSE BLD-MCNC: 99 MG/DL (ref 74–100)
HCT VFR BLD CALC: 28.7 % (ref 36.3–47.1)
HEMOGLOBIN: 9.5 G/DL (ref 11.9–15.1)
IMMATURE GRANULOCYTES: ABNORMAL %
LYMPHOCYTES # BLD: 17 % (ref 24–43)
MAGNESIUM: 1.6 MG/DL (ref 1.6–2.6)
MCH RBC QN AUTO: 33.2 PG (ref 25.2–33.5)
MCHC RBC AUTO-ENTMCNC: 33.1 G/DL (ref 28.4–34.8)
MCV RBC AUTO: 100.3 FL (ref 82.6–102.9)
MONOCYTES # BLD: 9 % (ref 3–12)
NRBC AUTOMATED: ABNORMAL PER 100 WBC
PDW BLD-RTO: 52 % (ref 11.8–14.4)
PLATELET # BLD: 186 K/UL (ref 138–453)
PLATELET ESTIMATE: ABNORMAL
PMV BLD AUTO: 13 FL (ref 8.1–13.5)
POTASSIUM SERPL-SCNC: 4.2 MMOL/L (ref 3.7–5.3)
RBC # BLD: 2.86 M/UL (ref 3.95–5.11)
RBC # BLD: ABNORMAL 10*6/UL
SEG NEUTROPHILS: 72 % (ref 36–65)
SEGMENTED NEUTROPHILS ABSOLUTE COUNT: 5.23 K/UL (ref 1.5–8.1)
SODIUM BLD-SCNC: 130 MMOL/L (ref 135–144)
TOTAL PROTEIN: 6.3 G/DL (ref 6.4–8.3)
WBC # BLD: 7.3 K/UL (ref 3.5–11.3)
WBC # BLD: ABNORMAL 10*3/UL

## 2020-03-23 PROCEDURE — 85025 COMPLETE CBC W/AUTO DIFF WBC: CPT

## 2020-03-23 PROCEDURE — 6370000000 HC RX 637 (ALT 250 FOR IP): Performed by: FAMILY MEDICINE

## 2020-03-23 PROCEDURE — 97162 PT EVAL MOD COMPLEX 30 MIN: CPT

## 2020-03-23 PROCEDURE — 2000000000 HC ICU R&B

## 2020-03-23 PROCEDURE — 36415 COLL VENOUS BLD VENIPUNCTURE: CPT

## 2020-03-23 PROCEDURE — 71046 X-RAY EXAM CHEST 2 VIEWS: CPT

## 2020-03-23 PROCEDURE — 6370000000 HC RX 637 (ALT 250 FOR IP): Performed by: INTERNAL MEDICINE

## 2020-03-23 PROCEDURE — 2580000003 HC RX 258: Performed by: INTERNAL MEDICINE

## 2020-03-23 PROCEDURE — 83735 ASSAY OF MAGNESIUM: CPT

## 2020-03-23 PROCEDURE — 99223 1ST HOSP IP/OBS HIGH 75: CPT | Performed by: FAMILY MEDICINE

## 2020-03-23 PROCEDURE — 93010 ELECTROCARDIOGRAM REPORT: CPT | Performed by: INTERNAL MEDICINE

## 2020-03-23 PROCEDURE — 82947 ASSAY GLUCOSE BLOOD QUANT: CPT

## 2020-03-23 PROCEDURE — 6360000002 HC RX W HCPCS: Performed by: FAMILY MEDICINE

## 2020-03-23 PROCEDURE — 97167 OT EVAL HIGH COMPLEX 60 MIN: CPT

## 2020-03-23 PROCEDURE — 80053 COMPREHEN METABOLIC PANEL: CPT

## 2020-03-23 PROCEDURE — 2580000003 HC RX 258: Performed by: FAMILY MEDICINE

## 2020-03-23 PROCEDURE — 93005 ELECTROCARDIOGRAM TRACING: CPT | Performed by: FAMILY MEDICINE

## 2020-03-23 RX ORDER — FUROSEMIDE 10 MG/ML
20 INJECTION INTRAMUSCULAR; INTRAVENOUS ONCE
Status: COMPLETED | OUTPATIENT
Start: 2020-03-23 | End: 2020-03-23

## 2020-03-23 RX ORDER — MAGNESIUM SULFATE 1 G/100ML
1 INJECTION INTRAVENOUS ONCE
Status: COMPLETED | OUTPATIENT
Start: 2020-03-23 | End: 2020-03-23

## 2020-03-23 RX ADMIN — SPIRONOLACTONE 25 MG: 25 TABLET, FILM COATED ORAL at 08:59

## 2020-03-23 RX ADMIN — METOPROLOL TARTRATE 50 MG: 50 TABLET, FILM COATED ORAL at 17:36

## 2020-03-23 RX ADMIN — FLUOXETINE 20 MG: 20 CAPSULE ORAL at 08:58

## 2020-03-23 RX ADMIN — APIXABAN 2.5 MG: 2.5 TABLET, FILM COATED ORAL at 08:59

## 2020-03-23 RX ADMIN — SODIUM CHLORIDE: 9 INJECTION, SOLUTION INTRAVENOUS at 02:10

## 2020-03-23 RX ADMIN — ATORVASTATIN CALCIUM 20 MG: 20 TABLET, FILM COATED ORAL at 20:41

## 2020-03-23 RX ADMIN — METOPROLOL TARTRATE 50 MG: 50 TABLET, FILM COATED ORAL at 08:58

## 2020-03-23 RX ADMIN — PIPERACILLIN SODIUM,TAZOBACTAM SODIUM 3.38 G: 3; .375 INJECTION, POWDER, FOR SOLUTION INTRAVENOUS at 20:42

## 2020-03-23 RX ADMIN — MAGNESIUM SULFATE HEPTAHYDRATE 1 G: 1 INJECTION, SOLUTION INTRAVENOUS at 09:43

## 2020-03-23 RX ADMIN — Medication 10 ML: at 20:41

## 2020-03-23 RX ADMIN — FUROSEMIDE 20 MG: 10 INJECTION, SOLUTION INTRAMUSCULAR; INTRAVENOUS at 17:36

## 2020-03-23 RX ADMIN — PIPERACILLIN SODIUM,TAZOBACTAM SODIUM 3.38 G: 3; .375 INJECTION, POWDER, FOR SOLUTION INTRAVENOUS at 04:57

## 2020-03-23 RX ADMIN — PIPERACILLIN SODIUM,TAZOBACTAM SODIUM 3.38 G: 3; .375 INJECTION, POWDER, FOR SOLUTION INTRAVENOUS at 13:41

## 2020-03-23 ASSESSMENT — PAIN SCALES - GENERAL
PAINLEVEL_OUTOF10: 0

## 2020-03-23 NOTE — CARE COORDINATION
Chart Reviewed:  -Received notification that patient was in [facility] ED on 3/20/20  -Admission to ICU room 307  -Admitted with septic shock       Future Appointments   Date Time Provider Kiran Gomezi   4/3/2020 10:15 AM MD Linda Ca   8/17/2020 10:20 AM Sherif Herman MD TIFF CARD [facility]         Care Coordination Plan of Care: This nurse Care Coordinator will await notification of patient's discharge and follow up with CTN at that time.

## 2020-03-23 NOTE — DISCHARGE INSTR - COC
Continuity of Care Form    Patient Name: Suzy Chau   :  1933  MRN:  [id number]    Admit date:  3/20/2020  Discharge date:  3/25/2020      Code Status Order: DNR-CCA   Advance Directives:   [facility] Documentation     Date/Time Healthcare Directive Type of Healthcare Directive Copy in [facility] [address] Agent's Name Healthcare Agent's Phone Number    [phone]  Yes, patient has an advance directive for healthcare treatment  Durable power of  for health care  Yes, copy in chart  Adult Children  Jenny Paula and Olive Serrano  --          Admitting Physician:  Khoa Gaviria MD  PCP: Khoa Gaviria MD    Discharging Nurse: [facility] Unit/Room#: 8921/8751-18  Discharging Unit Phone Number: [phone]    Emergency Contact:   Extended Emergency Contact Information  Primary Emergency Contact: Conchodianelobo [facility])  [place] Phone: [phone]  Relation: Child  Secondary Emergency Contact: Consuelo Garcia [address] Phone: [phone]  Relation: Child    Past Surgical History:  Past Surgical History:   Procedure Laterality Date    JOINT REPLACEMENT         Immunization History:   Immunization History   Administered Date(s) Administered    Influenza Vaccine, unspecified formulation 2016    Influenza Virus Vaccine 2014    Influenza, High Dose (Fluzone 65 yrs and older) 2017    Influenza, Quadv, IM, PF (6 mo and older Fluzone, Flulaval, Fluarix, and 3 yrs and older Afluria) 2018    Influenza, Triv, inactivated, subunit, adjuvanted, IM (Fluad 65 yrs and older) 10/01/2019    Pneumococcal Conjugate 13-valent ([id number]) 2017    Pneumococcal Polysaccharide ([id number]) 2012       Active Problems:  Patient Active Problem List   Diagnosis Code    Recurrent major depressive disorder, in partial remission ([facility] F33.41    Diabetes mellitus without complication ([facility] E88.2    Osteoarthritis M19.90    Essential hypertension I10    Hyperlipidemia E78.5    Right shoulder pain M25.511    Paroxysmal atrial fibrillation (HCC) I48.0    Ischemic stroke ([facility]) I63.9    Atrial fibrillation with rapid ventricular response (HCC) I48.91    Encounter for current long-term use of anticoagulants Z79.01    Septic shock ([facility]) A41.9, R65.21    Bradycardia R00.1    Pneumonia of right middle lobe due to infectious organism (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) I50.43       Isolation/Infection:   Isolation          No Isolation        Patient Infection Status     None to display          Nurse Assessment:  Last Vital Signs: /76   Pulse 69   Temp 97 °F (36.1 °C) (Temporal)   Resp 20   Ht 5' 5\" (1.651 m)   Wt 132 lb 3.2 oz (60 kg)   SpO2 99%   Breastfeeding No   BMI 22.00 kg/m²     Last documented pain score (0-10 scale): Pain Level: 0  Last Weight:   Wt Readings from Last 1 Encounters:   03/24/20 132 lb 3.2 oz (60 kg)     Mental Status:  coherent    IV Access:  - Peripheral IV - site  L FA, insertion date: 3/21    Nursing Mobility/ADLs:  Walking   Assisted  Transfer  Assisted  Bathing  Assisted  Dressing  Assisted  Toileting  Assisted  Feeding  Assisted  Med Admin  Assisted  Med Delivery   whole and prefers mixed with applesauce    Wound Care Documentation and Therapy:        Elimination:  Continence:   · Bowel: Yes  · Bladder: Yes  Urinary Catheter: None   Colostomy/Ileostomy/Ileal Conduit: No       Date of Last BM: 3/25/2020    Intake/Output Summary (Last 24 hours) at 3/24/2020 1401  Last data filed at 3/24/2020 0725  Gross per 24 hour   Intake 110 ml   Output 350 ml   Net -240 ml     I/O last 3 completed shifts: In: 160 [P.O.:150; I.V.:10]  Out: -     Safety Concerns:      At Risk for Falls    Impairments/Disabilities:      Speech and expressive aphasia    Nutrition Therapy:  Current Nutrition Therapy:   - Oral Diet:  Dysphagia 1 pureed    Routes of Feeding: Oral  Liquids: No Restrictions  Daily Fluid Restriction: no  Last Modified Barium Swallow with Video (Video Swallowing Test): not done    Treatments at the Time of Hospital Discharge:   Respiratory Treatments: ***  Oxygen Therapy:  is not on home oxygen therapy. Ventilator:    - No ventilator support    Rehab Therapies: Physical Therapy and Occupational Therapy  Weight Bearing Status/Restrictions: No weight bearing restirctions  Other Medical Equipment (for information only, NOT a DME order):  walker  Other Treatments: ***    Patient's personal belongings (please select all that are sent with patient):  None, undergarments slippers sweater pants shirt    RN SIGNATURE:  Electronically signed by Erika Sunshine RN on 3/25/20 at 2:21 PM EDT    CASE MANAGEMENT/SOCIAL WORK SECTION    Inpatient Status Date: 3/20/2020    Readmission Risk Assessment Score:  Readmission Risk              Risk of Unplanned Readmission:        18           Discharging to Facility/ Agency   · Name: [facility]  · Address: [address]  · KDIOJ:  · [place] [facility] (if applicable)   · Name:  · Address:  · Dialysis Schedule:  · Phone:  · Fax:    / signature: Electronically signed by JIMMIE Amaya on 3/23/20 at 3:04 PM EDT    PHYSICIAN SECTION    Prognosis: Good    Condition at Discharge: Stable    Rehab Potential (if transferring to Rehab): Fair    Recommended Labs or Other Treatments After Discharge: CHEST XRAY IN 1 WEEK. SEND RESULTS TO DR. Charu White. IV ZOSYN 3/375GM IV Q 8 HOURS X 4 DAYS    Physician Certification: I certify the above information and transfer of Arun Sheth  is necessary for the continuing treatment of the diagnosis listed and that she requires [facility] for less 30 days.      Update Admission H&P: No change in H&P    PHYSICIAN SIGNATURE:  Electronically signed by ALEYDA Izquierdo - CNP on 3/25/20 at 2:07 PM EDT

## 2020-03-23 NOTE — PROGRESS NOTES
Physical Therapy    Facility/Department: [facility]  Initial Assessment    NAME: Lizzy Herbert  : 1933  MRN: [id number]    Date of Service: 3/23/2020    Discharge Recommendations:  Continue to assess pending progress, Subacute/Skilled Nursing Facility, ECF with PT   PT Equipment Recommendations  Equipment Needed: No    Assessment   Body structures, Functions, Activity limitations: Decreased functional mobility ; Decreased high-level IADLs;Decreased posture;Decreased ADL status; Decreased endurance;Decreased strength;Decreased balance  Assessment: The patient is an 80 y.o. female who was admitted due to septic shock. She demonstrates LE weakness, decreased activity endurance, and impaired sitting and standing balance. She would benefit from skilled PT to address her deficits to improve functional mobility. Treatment Diagnosis: General weakness  Prognosis: Fair  Decision Making: Medium Complexity  REQUIRES PT FOLLOW UP: Yes  Activity Tolerance  Activity Tolerance: Patient limited by endurance       Patient Diagnosis(es): The primary encounter diagnosis was Septic shock ([facility] Diagnoses of Hypothermia, initial encounter, Bradycardia, and Pleural effusion were also pertinent to this visit. has a past medical history of Atrial fibrillation ([address] Dementia ([facility] [address] [facility] Depressive disorder, not elsewhere classified, Osteoarthrosis, unspecified whether generalized or localized, unspecified site, Pure hypercholesterolemia, Type II or unspecified type diabetes mellitus without mention of complication, not stated as uncontrolled, and Unspecified essential hypertension. has a past surgical history that includes joint replacement ().     Restrictions  Restrictions/Precautions  Restrictions/Precautions: General Precautions, Fall Risk  Vision/Hearing  Vision: Within Functional Limits  Hearing: Within functional limits     Subjective  General  Chart Reviewed: Yes  Patient assessed for rehabilitation services?: Yes  Family Assistance  Ambulation  Ambulation?: Yes  WB Status: FWB  Ambulation 1  Surface: level tile  Device: Rolling Walker  Assistance: Maximum assistance  Quality of Gait: Pt with retro SHIMON, feet sliding forward as she attempted to step. Distance: 2 steps forward  Stairs/Curb  Stairs?: No     Balance  Posture: Poor  Sitting - Static: Fair;+  Sitting - Dynamic: Fair;-  Standing - Static: Poor  Standing - Dynamic: Poor        Plan   Plan  Times per week: 7  Times per day: Twice a day  Plan Comment: 1x/day on weekends  Safety Devices  Type of devices:  All fall risk precautions in place    AM-PAC Score     AM-PAC Inpatient Mobility without Stair Climbing Raw Score : 8 (03/23/20 1401)  AM-PAC Inpatient without Stair Climbing T-Scale Score : 30.65 (03/23/20 1401)  Mobility Inpatient CMS 0-100% Score: 80.91 (03/23/20 1401)  Mobility Inpatient without Stair CMS G-Code Modifier : CM (03/23/20 1401)    Goals  Short term goals  Time Frame for Short term goals: 10 days  Short term goal 1: Patient will amb 13' with FWW, CGA, without LOB  Short term goal 2: Patient will perform transfers with minimal assistance  Short term goal 3: Patient will perform bed mobility with SBA  Short term goal 4: Patient will tolerate 20-30 minutes of therex/act to improve endurance for ADLs  Patient Goals   Patient goals : Feel better       Therapy Time   Individual Concurrent Group Co-treatment   Time In 1320         Time Out 1335         Minutes 15         Timed Code Treatment Minutes: 15 Minutes       Ofe Benoit, PT, DPT

## 2020-03-23 NOTE — CONSULTS
(ALDACTONE) 25 MG tablet Take 1 tablet by mouth daily 2/10/20  Yes Pepe Licea MD   lisinopril (PRINIVIL;ZESTRIL) 20 MG tablet Take 1 tablet by mouth daily 1/28/20  Yes ALEYDA Hendrix CNP   metoprolol tartrate (LOPRESSOR) 25 MG tablet Take 1 tablet by mouth 2 times daily 1/28/20  Yes ALEYDA Hendrix CNP   diltiazem (CARDIZEM CD) 240 MG extended release capsule Take 1 capsule by mouth daily 1/29/20  Yes ALEYDA Hendrix CNP   docusate sodium (COLACE) 100 MG capsule Take 1 capsule by mouth 2 times daily as needed for Constipation 1/28/20  Yes ALEYDA Hendrix CNP   apixaban (ELIQUIS) 5 MG TABS tablet Take 5 mg by mouth 2 times daily   Yes Historical Provider, MD   Incontinence Supply Disposable (DISPOSABLE BRIEF MEDIUM) MISC USE AS NEEDED FOR INCONTINENT EPISODES 2/27/20   Carla Ahumada, MD   Incontinence Supply Disposable (ALWAYS FEMININE WIPES) MISC USE AS NEEDED FOR INCONTINENT EPISODES 2/27/20   Carla Ahumada, MD   hydrALAZINE (APRESOLINE) 10 MG tablet Take 1 tablet by mouth 2 times daily as needed (90) Prn for bp >140 mm of hg 1/3/20   Carla Ahumada, MD   acetaminophen (TYLENOL) 500 MG tablet Take 500 mg by mouth every 4 hours as needed for Pain    Historical Provider, MD   Incontinence Supply Disposable (FITTED BRIEFS MEDIUM) MISC USE AS NEEDED FOR INCONTINENCE 10/1/19   Carla Ahumada, MD   Diapers & Supplies (GOODNITES BED MATS) [facility] USE AS NEEDED FOR INCONTINENCE 10/1/19   Carla Ahumada, MD   Incontinence Supply Disposable (ALWAYS FEMININE WIPES) MISC USE AS NEEDED FOR INCONTINENCE 10/1/19   Carla Ahumada, MD       FAMILY HISTORY: family history is not on file. PHYSICAL EXAM:   /73   Pulse 93   Temp 98 °F (36.7 °C) (Temporal)   Resp 15   Ht 5' 5\" (1.651 m)   Wt 133 lb 8 oz (60.6 kg)   SpO2 98%   Breastfeeding No   BMI 22.22 kg/m²  Body mass index is 22.22 kg/m². Constitutional: She is oriented to person, place, and time.  She Pleural effusion, bilateral 03/20/2020    Atrial fibrillation with rapid ventricular response ([facility] 01/24/2020    Ischemic stroke (HCC)     Paroxysmal atrial fibrillation ([facility] 08/02/2017    Hyperlipidemia 05/03/2016    Right shoulder pain 05/03/2016    Recurrent major depressive disorder, in partial remission ([facility]     [facility]     Osteoarthritis     Essential hypertension         PLAN:   Acute on chronic diastolic heart failure: New York Heart Association Class: III-IV. Up 6 lbs since admission   Beta Blocker: Continue Metoprolol tartrate (Lopressor) 50 mg bid.  Diuretics: START furosemide (Lasix) 20 mg IV every morning. And stop normal Saline. I discussed this with Dr. Gabrielle Rebolledo and he was in agreement    Heart failure counseling: I advised them to try and keep their legs up whenever possible and to limit salt in their diet. · Left Shoulder Pain: Appears to be rotator cuff as worse with raising arm. Continue Tylenol for her pain. · Paroxysmal Atrial Fibrillation: Rate Control Symptomatic. Currently in normal sinus rhythm and no known recurrence  · Beta Blocker: Continue Metoprolol tartrate (Lopressor) 50 mg bid. Titrate up as needed to keep HR<110  · Calcium Channel Blocker: Not indicated at this time. · Stroke Risk: CHADS2-VASc Score: 7/9 (9.6% stroke risk)  ? [id number] Score for Atrial Fibrillation Stroke Risk    Risk   Factors   Component Value   C CHF No 0   H HTN Yes 1   A2 Age >= 76 Yes,  (80 y.o.) 2   D DM Yes 1   S2 Prior Stroke/TIA Yes 2   V Vascular Disease No 0   A Age 74-69 No,  (80 y.o.) 0   Sc Sex female 1     [id number]  Score   7   § Score last updated 1/24/20 33:09 PM  §    § Click here for a link to the UpToDate guideline \"Atrial Fibrillation: Anticoagulation therapy to prevent embolization  § Disclaimer: Risk Score calculation is dependent on accuracy of patient problem list and past encounter diagnosis.   § Anticoagulation: HOLD

## 2020-03-23 NOTE — PLAN OF CARE
Aroused easily. Respirations easy and unlabored. Lungs diminished throughout. No peripheral edema noted. Side rails up x 4. Call light within reach.
Pain:  Goal: Patient's pain/discomfort is manageable  Description: Patient's pain/discomfort is manageable  Outcome: Ongoing  Note: Pain assessed every 4 hours. Patient is able to communicate with staff the need for pain interventions. Patient currently denies pain. Will continue to monitor. Problem: Skin Integrity:  Goal: Skin integrity will stabilize  Description: Skin integrity will stabilize  Outcome: Ongoing  Note: Skin assessment performed, no breakdown noted at this time. Patient skin is dry and intact. Will continue to monitor for redness or breakdown. Several large areas of bruising to BUE.       Problem: Discharge Planning:  Goal: Patients continuum of care needs are met  Description: Patients continuum of care needs are met  Outcome: Ongoing

## 2020-03-23 NOTE — PROGRESS NOTES
Aroused easily. Complete bath given. Smear of soft brown stool noted. Gown and linen changed. C/O being cold. Blankets reapplied. Respirations easy  And unlabored. Monitor shows atrial fibrillation. VS stable.

## 2020-03-23 NOTE — PROGRESS NOTES
Nutrition Assessment    Type and Reason for Visit: Initial    Nutrition Recommendations:  Encourage PO    Nutrition Assessment: Biting and chewing difficulties, r/t neurological disorder, AEB spitting out of solid foods. Diet downgraded to purees but still poor intakes. Near signficant weight losses from last month noted. Good glycemia. Patient unable to provide much useful information upon visit. Will add nutrition supplement. Malnutrition Assessment:  · Malnutrition Status: At risk for malnutrition  · Context: Acute illness or injury  · Findings of the 6 clinical characteristics of malnutrition (Minimum of 2 out of 6 clinical characteristics is required to make the diagnosis of moderate or severe Protein Calorie Malnutrition based on AND/ASPEN Guidelines):  1. Energy Intake-Less than or equal to 50% of estimated energy requirement, Unable to assess    2. Weight Loss-2% loss or greater, in 1 month  3. Fat Loss-No significant subcutaneous fat loss,    4. Muscle Loss-Mild muscle mass loss, Temples (temporalis muscle), Clavicles (pectoralis and deltoids), Interosseous  5. Fluid Accumulation-No significant fluid accumulation,    6.  Strength-Not measured    Nutrition Risk Level:  Moderate    Nutrient Needs:  · Estimated Daily Total Kcal: 1375-1917(15-42)  · Estimated Daily Protein (g): 73-79(1.2-1.3)  · Estimated Daily Total Fluid (ml/day): 1700    Nutrition Diagnosis:   · Problem: Biting/chewing difficulty  · Etiology: related to Cognitive or neurological impairment     Signs and symptoms:  as evidenced by Other (Comment)(spitting out solids when offered)    Objective Information:  · Nutrition-Focused Physical Findings: mild muscular losses  · Wound Type: None  · Current Nutrition Therapies:  · Oral Diet Orders: Dysphagia Pureed (Dysphagia 1), Carb Control 4 Carbs/Meal   · Oral Diet intake: 1-25%  · Oral Nutrition Supplement (ONS) Orders: None  · Anthropometric Measures:  · Ht: 5' 5\" (165.1 cm)

## 2020-03-23 NOTE — PROGRESS NOTES
135 - 144 mmol/L    Potassium 4.9 3.7 - 5.3 mmol/L    Chloride 97 (L) 98 - 107 mmol/L    CO2 20 20 - 31 mmol/L    Anion Gap 11 9 - 17 mmol/L    Alkaline Phosphatase 49 35 - 104 U/L    ALT 8 5 - 33 U/L    AST 19 <32 U/L    Total Bilirubin 0.81 0.3 - 1.2 mg/dL    Total Protein 6.5 6.4 - 8.3 g/dL    Alb 3.1 (L) 3.5 - 5.2 g/dL    Albumin/Globulin Ratio 0.9 (L) 1.0 - 2.5    GFR Non- 46 (L) >60 mL/min    GFR  56 (L) >60 mL/min    GFR Comment          GFR Staging         Troponin   Result Value Ref Range    Troponin, High Sensitivity 31 (H) 0 - 14 ng/L    Troponin T NOT REPORTED <0.03 ng/mL    Troponin Interp NOT REPORTED    Brain Natriuretic Peptide   Result Value Ref Range    Pro-BNP 3,773 (H) <300 pg/mL    BNP Interpretation Pro-BNP Reference Range:    Protime-INR   Result Value Ref Range    Protime 12.9 (H) 9.7 - 12.2 sec    INR 1.3 (H) 0.9 - 1.2   APTT   Result Value Ref Range    PTT 26.1 23.2 - 34.4 sec   Lactate, Sepsis   Result Value Ref Range    Lactic Acid, Sepsis 2.0 (H) 0.5 - 1.9 mmol/L    Lactic Acid, Sepsis, Whole Blood NOT REPORTED 0.5 - 1.9 mmol/L   Urinalysis   Result Value Ref Range    Color, UA YELLOW YELLOW    Turbidity UA CLEAR CLEAR    Glucose, Ur NEGATIVE NEGATIVE    Bilirubin Urine NEGATIVE NEGATIVE    Ketones, Urine NEGATIVE NEGATIVE    Specific Gravity, UA 1.020 1.010 - 1.020    Urine Hgb NEGATIVE NEGATIVE    pH, UA 6.5 5.0 - 9.0    Protein, UA NEGATIVE NEGATIVE    Urobilinogen, Urine ELEVATED (A) Normal    Nitrite, Urine NEGATIVE NEGATIVE    Leukocyte Esterase, Urine NEGATIVE NEGATIVE    Urinalysis Comments NOT REPORTED    TSH with Reflex   Result Value Ref Range    TSH 2.50 0.30 - 5.00 mIU/L   CK   Result Value Ref Range    Total CK 25 (L) 26 - 192 U/L   Microscopic Urinalysis   Result Value Ref Range    -          WBC, UA 0 TO 2 0 - 5 /HPF    RBC, UA None 0 - 2 /HPF    Casts UA NOT REPORTED /LPF    Crystals, UA NOT REPORTED None /HPF    Epithelial Cells UA 0 TO 2 Phosphatase 54 35 - 104 U/L    ALT 10 5 - 33 U/L    AST 18 <32 U/L    Total Bilirubin 0.76 0.3 - 1.2 mg/dL    Total Protein 6.8 6.4 - 8.3 g/dL    Alb 3.2 (L) 3.5 - 5.2 g/dL    Albumin/Globulin Ratio 0.9 (L) 1.0 - 2.5    GFR Non-African American >60 >60 mL/min    GFR African American >60 >60 mL/min    GFR Comment          GFR Staging         Glucose, Whole Blood   Result Value Ref Range    POC Glucose 121 (H) 74 - 100 mg/dL   Glucose, Whole Blood   Result Value Ref Range    POC Glucose 134 (H) 74 - 100 mg/dL   Basic Metabolic Panel   Result Value Ref Range    Glucose 124 (H) 70 - 99 mg/dL    BUN 23 8 - 23 mg/dL    CREATININE 0.85 0.50 - 0.90 mg/dL    Bun/Cre Ratio 27 (H) 9 - 20    Calcium 8.6 8.6 - 10.4 mg/dL    Sodium 129 (L) 135 - 144 mmol/L    Potassium 4.8 3.7 - 5.3 mmol/L    Chloride 98 98 - 107 mmol/L    CO2 21 20 - 31 mmol/L    Anion Gap 10 9 - 17 mmol/L    GFR Non-African American >60 >60 mL/min    GFR African American >60 >60 mL/min    GFR Comment          GFR Staging         Glucose, Whole Blood   Result Value Ref Range    POC Glucose 152 (H) 74 - 100 mg/dL   Glucose, Whole Blood   Result Value Ref Range    POC Glucose 113 (H) 74 - 100 mg/dL   CBC auto differential   Result Value Ref Range    WBC 8.2 3.5 - 11.3 k/uL    RBC 2.95 (L) 3.95 - 5.11 m/uL    Hemoglobin 9.7 (L) 11.9 - 15.1 g/dL    Hematocrit 29.8 (L) 36.3 - 47.1 %    .0 82.6 - 102.9 fL    MCH 32.9 25.2 - 33.5 pg    MCHC 32.6 28.4 - 34.8 g/dL    RDW 14.0 11.8 - 14.4 %    Platelets 128 722 - 151 k/uL    MPV 11.1 8.1 - 13.5 fL    NRBC Automated 0.0 0.0 per 100 WBC    Differential Type NOT REPORTED     WBC Morphology NOT REPORTED     RBC Morphology NOT REPORTED     Platelet Estimate NOT REPORTED     Seg Neutrophils 80 (H) 36 - 65 %    Lymphocytes 16 (L) 24 - 43 %    Monocytes 3 3 - 12 %    Eosinophils % 1 1 - 4 %    Immature Granulocytes 0 0 %    Basophils 0 0 - 2 %    Segs Absolute 6.56 1.50 - 8.10 k/uL    Absolute Lymph # 1.31 1.10 - 3.70 k/uL 6.3 (L) 6.4 - 8.3 g/dL    Alb 3.0 (L) 3.5 - 5.2 g/dL    Albumin/Globulin Ratio 0.9 (L) 1.0 - 2.5    GFR Non-African American >60 >60 mL/min    GFR African American >60 >60 mL/min    GFR Comment          GFR Staging         Glucose, Whole Blood   Result Value Ref Range    POC Glucose 109 (H) 74 - 100 mg/dL   EKG 12 Lead   Result Value Ref Range    Ventricular Rate 53 BPM    Atrial Rate 119 BPM    QRS Duration 94 ms    Q-T Interval 522 ms    QTc Calculation (Bazett) 489 ms    R Axis -69 degrees    T Axis 10 degrees       ASSESSMENT:   Patient Active Problem List    Diagnosis Date Noted    Encounter for current long-term use of anticoagulants 02/10/2020     Priority: High    Septic shock (HCC) 03/20/2020    Bradycardia 03/20/2020    Pneumonia 03/20/2020    Pleural effusion, bilateral 03/20/2020    Atrial fibrillation with rapid ventricular response ([address] 01/24/2020    Ischemic stroke (HCC)     Paroxysmal atrial fibrillation ([address] 08/02/2017    Hyperlipidemia 05/03/2016    Right shoulder pain 05/03/2016    Recurrent major depressive disorder, in partial remission ([address]     Diabetes mellitus without complication (HCC)     Osteoarthritis     Essential hypertension          PLAN:  · sepitic shock- better  · Pneumonia,pleural effusion- repeatcxr,continuie antibiotics  · afib- rate well controlled but her bp lower,repeat ekg,cardiology eval  · H/o cva- has minimal residual weakness  · Type 2 dm- blood sugars well controlled      Lou Jalloh M.D.

## 2020-03-24 LAB
ABSOLUTE EOS #: 0.16 K/UL (ref 0–0.44)
ABSOLUTE IMMATURE GRANULOCYTE: 0.03 K/UL (ref 0–0.3)
ABSOLUTE LYMPH #: 1.05 K/UL (ref 1.1–3.7)
ABSOLUTE MONO #: 0.75 K/UL (ref 0.1–1.2)
ALBUMIN SERPL-MCNC: 2.9 G/DL (ref 3.5–5.2)
ALBUMIN/GLOBULIN RATIO: 0.8 (ref 1–2.5)
ALP BLD-CCNC: 46 U/L (ref 35–104)
ALT SERPL-CCNC: 10 U/L (ref 5–33)
ANION GAP SERPL CALCULATED.3IONS-SCNC: 10 MMOL/L (ref 9–17)
AST SERPL-CCNC: 21 U/L
BASOPHILS # BLD: 1 % (ref 0–2)
BASOPHILS ABSOLUTE: 0.05 K/UL (ref 0–0.2)
BILIRUB SERPL-MCNC: 0.78 MG/DL (ref 0.3–1.2)
BUN BLDV-MCNC: 17 MG/DL (ref 8–23)
BUN/CREAT BLD: 19 (ref 9–20)
CALCIUM SERPL-MCNC: 8.5 MG/DL (ref 8.6–10.4)
CHLORIDE BLD-SCNC: 98 MMOL/L (ref 98–107)
CO2: 20 MMOL/L (ref 20–31)
CREAT SERPL-MCNC: 0.88 MG/DL (ref 0.5–0.9)
DIFFERENTIAL TYPE: ABNORMAL
EOSINOPHILS RELATIVE PERCENT: 2 % (ref 1–4)
GFR AFRICAN AMERICAN: >60 ML/MIN
GFR NON-AFRICAN AMERICAN: >60 ML/MIN
GFR SERPL CREATININE-BSD FRML MDRD: ABNORMAL ML/MIN/{1.73_M2}
GFR SERPL CREATININE-BSD FRML MDRD: ABNORMAL ML/MIN/{1.73_M2}
GLUCOSE BLD-MCNC: 119 MG/DL (ref 70–99)
GLUCOSE BLD-MCNC: 125 MG/DL (ref 74–100)
GLUCOSE BLD-MCNC: 128 MG/DL (ref 74–100)
GLUCOSE BLD-MCNC: 130 MG/DL (ref 74–100)
GLUCOSE BLD-MCNC: 94 MG/DL (ref 74–100)
HCT VFR BLD CALC: 31.5 % (ref 36.3–47.1)
HEMOGLOBIN: 10.3 G/DL (ref 11.9–15.1)
IMMATURE GRANULOCYTES: 0 %
LYMPHOCYTES # BLD: 13 % (ref 24–43)
MAGNESIUM: 2.1 MG/DL (ref 1.6–2.6)
MCH RBC QN AUTO: 33 PG (ref 25.2–33.5)
MCHC RBC AUTO-ENTMCNC: 32.7 G/DL (ref 28.4–34.8)
MCV RBC AUTO: 101 FL (ref 82.6–102.9)
MONOCYTES # BLD: 10 % (ref 3–12)
NRBC AUTOMATED: 0 PER 100 WBC
PDW BLD-RTO: 14.3 % (ref 11.8–14.4)
PLATELET # BLD: 190 K/UL (ref 138–453)
PLATELET ESTIMATE: ABNORMAL
PMV BLD AUTO: 11.4 FL (ref 8.1–13.5)
POTASSIUM SERPL-SCNC: 3.7 MMOL/L (ref 3.7–5.3)
RBC # BLD: 3.12 M/UL (ref 3.95–5.11)
RBC # BLD: ABNORMAL 10*6/UL
SEG NEUTROPHILS: 74 % (ref 36–65)
SEGMENTED NEUTROPHILS ABSOLUTE COUNT: 5.78 K/UL (ref 1.5–8.1)
SODIUM BLD-SCNC: 128 MMOL/L (ref 135–144)
TOTAL PROTEIN: 6.6 G/DL (ref 6.4–8.3)
WBC # BLD: 7.8 K/UL (ref 3.5–11.3)
WBC # BLD: ABNORMAL 10*3/UL

## 2020-03-24 PROCEDURE — 97110 THERAPEUTIC EXERCISES: CPT

## 2020-03-24 PROCEDURE — 6360000002 HC RX W HCPCS: Performed by: NURSE PRACTITIONER

## 2020-03-24 PROCEDURE — 6370000000 HC RX 637 (ALT 250 FOR IP): Performed by: NURSE PRACTITIONER

## 2020-03-24 PROCEDURE — 80053 COMPREHEN METABOLIC PANEL: CPT

## 2020-03-24 PROCEDURE — 1200000000 HC SEMI PRIVATE

## 2020-03-24 PROCEDURE — 6370000000 HC RX 637 (ALT 250 FOR IP): Performed by: INTERNAL MEDICINE

## 2020-03-24 PROCEDURE — 6370000000 HC RX 637 (ALT 250 FOR IP): Performed by: FAMILY MEDICINE

## 2020-03-24 PROCEDURE — 97535 SELF CARE MNGMENT TRAINING: CPT

## 2020-03-24 PROCEDURE — 83735 ASSAY OF MAGNESIUM: CPT

## 2020-03-24 PROCEDURE — 97116 GAIT TRAINING THERAPY: CPT

## 2020-03-24 PROCEDURE — 2580000003 HC RX 258: Performed by: NURSE PRACTITIONER

## 2020-03-24 PROCEDURE — 2580000003 HC RX 258: Performed by: FAMILY MEDICINE

## 2020-03-24 PROCEDURE — 82947 ASSAY GLUCOSE BLOOD QUANT: CPT

## 2020-03-24 PROCEDURE — 36415 COLL VENOUS BLD VENIPUNCTURE: CPT

## 2020-03-24 PROCEDURE — 97530 THERAPEUTIC ACTIVITIES: CPT

## 2020-03-24 PROCEDURE — 6360000002 HC RX W HCPCS: Performed by: FAMILY MEDICINE

## 2020-03-24 PROCEDURE — 85025 COMPLETE CBC W/AUTO DIFF WBC: CPT

## 2020-03-24 PROCEDURE — 99232 SBSQ HOSP IP/OBS MODERATE 35: CPT | Performed by: FAMILY MEDICINE

## 2020-03-24 RX ORDER — FLUCONAZOLE 100 MG/1
100 TABLET ORAL DAILY
Status: DISCONTINUED | OUTPATIENT
Start: 2020-03-24 | End: 2020-03-25 | Stop reason: HOSPADM

## 2020-03-24 RX ORDER — DILTIAZEM HYDROCHLORIDE 180 MG/1
180 CAPSULE, COATED, EXTENDED RELEASE ORAL DAILY
Status: DISCONTINUED | OUTPATIENT
Start: 2020-03-24 | End: 2020-03-25 | Stop reason: HOSPADM

## 2020-03-24 RX ORDER — FUROSEMIDE 10 MG/ML
20 INJECTION INTRAMUSCULAR; INTRAVENOUS DAILY
Status: DISCONTINUED | OUTPATIENT
Start: 2020-03-24 | End: 2020-03-25 | Stop reason: HOSPADM

## 2020-03-24 RX ORDER — DILTIAZEM HYDROCHLORIDE 120 MG/1
120 CAPSULE, COATED, EXTENDED RELEASE ORAL DAILY
Status: DISCONTINUED | OUTPATIENT
Start: 2020-03-24 | End: 2020-03-24

## 2020-03-24 RX ADMIN — ATORVASTATIN CALCIUM 20 MG: 20 TABLET, FILM COATED ORAL at 21:18

## 2020-03-24 RX ADMIN — NYSTATIN 500000 UNITS: 100000 SUSPENSION ORAL at 21:18

## 2020-03-24 RX ADMIN — METOPROLOL TARTRATE 50 MG: 50 TABLET, FILM COATED ORAL at 16:54

## 2020-03-24 RX ADMIN — PIPERACILLIN SODIUM,TAZOBACTAM SODIUM 3.38 G: 3; .375 INJECTION, POWDER, FOR SOLUTION INTRAVENOUS at 05:30

## 2020-03-24 RX ADMIN — METOPROLOL TARTRATE 50 MG: 50 TABLET, FILM COATED ORAL at 08:08

## 2020-03-24 RX ADMIN — PIPERACILLIN SODIUM,TAZOBACTAM SODIUM 3.38 G: 3; .375 INJECTION, POWDER, FOR SOLUTION INTRAVENOUS at 21:18

## 2020-03-24 RX ADMIN — FLUCONAZOLE 100 MG: 100 TABLET ORAL at 16:54

## 2020-03-24 RX ADMIN — Medication 10 ML: at 09:54

## 2020-03-24 RX ADMIN — DILTIAZEM HYDROCHLORIDE 180 MG: 180 CAPSULE, COATED, EXTENDED RELEASE ORAL at 21:18

## 2020-03-24 RX ADMIN — Medication 10 ML: at 21:19

## 2020-03-24 RX ADMIN — FLUOXETINE 20 MG: 20 CAPSULE ORAL at 08:08

## 2020-03-24 RX ADMIN — PIPERACILLIN SODIUM,TAZOBACTAM SODIUM 3.38 G: 3; .375 INJECTION, POWDER, FOR SOLUTION INTRAVENOUS at 13:28

## 2020-03-24 RX ADMIN — NYSTATIN 500000 UNITS: 100000 SUSPENSION ORAL at 16:54

## 2020-03-24 RX ADMIN — SPIRONOLACTONE 25 MG: 25 TABLET, FILM COATED ORAL at 08:08

## 2020-03-24 RX ADMIN — DILTIAZEM HYDROCHLORIDE 120 MG: 120 CAPSULE, COATED, EXTENDED RELEASE ORAL at 08:08

## 2020-03-24 RX ADMIN — FUROSEMIDE 20 MG: 10 INJECTION, SOLUTION INTRAMUSCULAR; INTRAVENOUS at 09:54

## 2020-03-24 ASSESSMENT — PAIN SCALES - GENERAL
PAINLEVEL_OUTOF10: 0
PAINLEVEL_OUTOF10: 0

## 2020-03-24 NOTE — PROGRESS NOTES
Progress Note    SUBJECTIVE:  F/u on hypotension and bradycardia    OBJECTIVE:  Patient is sitting up in the chair alert and oriented without distress. Patient denies CP or SOB. Monitor is Atrial Fibrillation 100-133. Tolerating diet and activity at this time.     Vitals:   Vitals:    03/24/20 1000   BP: 114/71   Pulse: 98   Resp: 18   Temp:    SpO2:      Weight: 132 lb 3.2 oz (60 kg)   Height: 5' 5\" (165.1 cm)   -----------------------------------------------------------------  Exam:    CONSTITUTIONAL:  awake, alert, cooperative, no apparent distress, and appears stated age  EYES:  Lids and lashes normal, pupils equal, round and reactive to light, extra ocular muscles intact, sclera clear, conjunctiva normal  ENT:  normocepalic, without obvious abnormality  NECK:  supple, symmetrical, trachea midline, skin normal and no stridor  HEMATOLOGIC/LYMPHATICS:  no cervical lymphadenopathy and no supraclavicular lymphadenopathy  LUNGS:  No increased work of breathing, good air exchange, clear to auscultation bilaterally, no crackles or wheezing  CARDIOVASCULAR: HR irregular--Afib 100-133, normal S1 and S2 and no edema  ABDOMEN:  No scars, normal bowel sounds, soft, non-distended, non-tender, no masses palpated, no hepatosplenomegally  MUSCULOSKELETAL:  there is no redness, warmth, or swelling of the joints  full range of motion noted  tone is normal  NEUROLOGIC:  Mental Status Exam:  Level of Alertness:   awake  Orientation:   person, place, time  Memory:   normal  SKIN:  no bruising or bleeding, normal skin color, texture, turgor, no redness, warmth, or swelling, no rashes and no lesions  EXT:     no cyanosis, clubbing or edema present    -----------------------------------------------------------------  Diagnostic Data: Reviewed    ASSESSMENT:   Active Problems:    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 *        PLAN:  · Start Cardizem  mg daily  · Start Lasix 20 IV daily  · Transfer to [facility]      Attestation 3/24/20    I personally evaluated and examined the patient face-to-face in conjunction with the NP and agree with the management and dispostition of the patient. Please see NP's progress note for full details. My key findings are:     SUBJECTIVE:    Patient seen for follow up of <principal problem not specified>. She is improving with antibiotics and diuresis     OBJECTIVE:    Vitals:   Temp: 98.5 °F (36.9 °C)  BP: 114/71  Resp: 18  Pulse: 98  SpO2: 99 %  24HR INTAKE/OUTPUT:      Intake/Output Summary (Last 24 hours) at 3/24/2020 1112  Last data filed at 3/24/2020 0725  Gross per 24 hour   Intake 110 ml   Output 350 ml   Net -240 ml     -----------------------------------------------------------------  Exam:  GEN:    Awake, alert and oriented x 3. no acute distress  EYES:  EOMI, pupils equal   NECK: Supple. No lymphadenopathy. No carotid bruit  CVS:    irregularly irregular,tachycardic,2/6 murmer,no gallop  PULM: has rhonchiand rales,diminished air entry at bases  ABD:    Bowels sounds normal.  Abdomen is soft. No distention. No tenderness. EXT:   no edema bilaterally . No calf tenderness. NEURO: Motor and sensory are intact  SKIN:  No rashes. No skin lesions.       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  *  ·   · [facility]  ·

## 2020-03-24 NOTE — PROGRESS NOTES
questions with education provided this date        Balance  Standing Balance: (fair to fair+ standing balance with walker)  Standing Balance  Time: ~3 minutes of standing this date  Activity: functional standing/ mobility     Transfers  Sit to stand: Moderate assistance  Stand to sit: Moderate assistance  Transfer Comments: Mod A x2 (PTA) to complete transfers with poor transfer tech and hand placement                                              Type of ROM/Therapeutic Exercise  Comment: Limited shoulder/ UE ROM and strengthen. Pt completed B UE Strengthening x2 exer with use of 1# weight 10 reps each. No additional c/o pain or SOB noted. Plan   Plan  Times per week: 7  Times per day: Daily  Current Treatment Recommendations: Strengthening, Functional Mobility Training, Neuromuscular Re-education, Endurance Training, Self-Care / ADL  G-Code     OutComes Score                                                  AM-PAC Score             Goals  Short term goals  Time Frame for Short term goals: 10 visits  Short term goal 1: Patient to complete functional transfers with mod A. Short term goal 2: Patient to complete 10 minutes ther-ex/ther-act in order to improve strength/endurance for ADLs. Short term goal 3: Patient to complete UB dressing with min A. Short term goal 4: Patient to complete LB ADLs with use of AE prn with max A.         Therapy Time Cotreatment with PT part of session   Individual Concurrent Group Co-treatment   Time In  915         Time Out  940         Minutes  [id number] [address] [facility] BELLE/GIO

## 2020-03-24 NOTE — PROGRESS NOTES
Physical Therapy  Facility/Department: [facility]  Daily Treatment Note  NAME: Neva Vazquez  : 1933  MRN: [id number]    Date of Service: 3/24/2020    Discharge Recommendations:  Continue to assess pending progress, Subacute/Skilled Nursing Facility, ECF with PT        Assessment   Treatment Diagnosis: General weakness  Prognosis: Fair  PT Education: Transfer Training;Gait Training  Patient Education: Review of therapy folder. REQUIRES PT FOLLOW UP: Yes  Activity Tolerance  Activity Tolerance: Patient limited by endurance     Patient Diagnosis(es): The primary encounter diagnosis was Septic shock ([facility] Diagnoses of Hypothermia, initial encounter, Bradycardia, and Pleural effusion were also pertinent to this visit. has a past medical history of Atrial fibrillation ([facility] Dementia ([facility] Depressive disorder, not elsewhere classified, Osteoarthrosis, unspecified whether generalized or localized, unspecified site, Pure hypercholesterolemia, Type II or unspecified type diabetes mellitus without mention of complication, not stated as uncontrolled, and Unspecified essential hypertension. has a past surgical history that includes joint replacement (). Restrictions  Restrictions/Precautions  Restrictions/Precautions: General Precautions, Fall Risk  Subjective   General  Chart Reviewed: Yes  Response To Previous Treatment: Patient with no complaints from previous session. Family / Caregiver Present: No  Subjective  Subjective: Patient denies any pain at this time. Orientation  Orientation  Overall Orientation Status: Within Functional Limits  Cognition      Objective   Bed mobility  Comment: Patient up in recliner upon arrival.  Transfers  Sit to Stand: Moderate Assistance  Stand to sit: Moderate Assistance  Comment: Max verbal and tactile cues for proper hand and foot placement. Initital retropulsion upon stand.   Ambulation  Ambulation?: Yes  Ambulation 1  Surface: level tile  Device: Rolling

## 2020-03-24 NOTE — PROGRESS NOTES
Nani Burciaga am scribing for and in the presence of Lisa Elizalde. Khadra LOZANO, MS, F.A.C.C. Patient: Aris Mckee  : 1933  Date of Admission: 3/20/2020  Primary Care Physician: Shawn Estrada  Today's Date: 3/24/2020    REASON FOR CONSULTATION: Altered Mental Status (Daughter assisting up from bathroom at home this morning. Pt not feeling well, sweaty and lethargic. Called EMS. Bradycardia and Hypotensive. Afib EKG. Given atropine and IV fluids en route.  ) and Bradycardia      HPI: Ms. Alphonso Danielle is a 80 y.o. female who was admitted to the hospital with alternated mental status. She was admitted to the hospital on 2020 with atrial fibrillation with RVR directly from Dr. Jessica Philippe office. She recently suffered a stroke, was treated at [facility] and discharged to a nursing home. Because of a large leg bruise she was sent to [address] and told to follow up with Dr. Goldie Valencia where she was found to be in atrial fibrillation with RVR leading to the hospitalization. She had an echocardiogram done on 2020 that showed the global LV systolic function is difficult to assess due to the patients rate and rhythm but appears to be at the lower limits of normal with an estimated EF of 50-55%. Moderate septal hypertrophy. Moderate aortic leaflet calcification without stenosis but with mild to moderate regurgitation. Moderate tricuspid regurgitation. Ms. Alphonso Danielle denied any current or recent chest pain, shortness of breath, abdominal pain, bleeding problems, problems with her medications or any other concerns at this time.       Past Medical History:   Diagnosis Date    Atrial fibrillation ([address]     Dementia (HCC)     Depressive disorder, not elsewhere classified     Osteoarthrosis, unspecified whether generalized or localized, unspecified site     Pure hypercholesterolemia     Type II or unspecified type diabetes mellitus without mention of complication, not stated as uncontrolled Pleural effusion, bilateral 03/20/2020    Atrial fibrillation with rapid ventricular response ([facility] 01/24/2020    Ischemic stroke (HCC)     Paroxysmal atrial fibrillation ([facility] 08/02/2017    Hyperlipidemia 05/03/2016    Right shoulder pain 05/03/2016    Recurrent major depressive disorder, in partial remission ([facility]     Diabetes mellitus without complication ([facility]     Osteoarthritis     Essential hypertension         PLAN:   Acute on chronic diastolic heart failure: New York Heart Association Class: III-IV. Up 6 lbs since admission but down 1 lb since starting lasix yesterday. Continue   Beta Blocker: Continue Metoprolol tartrate (Lopressor) 50 mg bid.  Diuretics: Continue furosemide (Lasix) 20 mg every morning.  Heart failure counseling: I advised them to try and keep their legs up whenever possible and to limit salt in their diet. · Moderate Right Plural Effusion: I ordered an ultra sound thoracentesis to be done tomorrow due to her moderate right pleural effusion. Continue to hold Eliquis now. · paroxysmal atrial fibrillation with RVR[de-identified]   · Calcium Channel Blocker: INCREASE to diltiazem CD (Cardizem CD) 180 mg once daily. · Paroxysmal Atrial Fibrillation: Rate Control Symptomatic. Currently in normal sinus rhythm and no known recurrence  · Beta Blocker: Continue Metoprolol tartrate (Lopressor) 50 mg bid. · Calcium Channel Blocker: INCREASE to diltiazem CD (Cardizem CD) 180 mg once daily. · Stroke Risk: CHADS2-VASc Score: 7/9 (9.6% stroke risk)  ?  [id number] Score for Atrial Fibrillation Stroke Risk    Risk   Factors   Component Value   C CHF No 0   H HTN Yes 1   A2 Age >= 76 Yes,  (80 y.o.) 2   D DM Yes 1   S2 Prior Stroke/TIA Yes 2   V Vascular Disease No 0   A Age 74-69 No,  (80 y.o.) 0   Sc Sex female 1     [id number]  Score   7     § Click here for a link to the UpToDate guideline \"Atrial Fibrillation: Anticoagulation therapy to prevent embolization  § Disclaimer: Risk Score calculation is dependent on accuracy of patient problem list and past encounter diagnosis. § Anticoagulation: Continue to HOLD Apixaban (Eliquis) for thoracentesis that she will have done tomorrow.      · History of Ischemic Stroke:  § Beta Blocker: Continue Metoprolol tartrate (Lopressor) 50 mg bid. § Calcium Channel Blocker: INCREASE to diltiazem CD (Cardizem CD) 180 mg once daily. § Restart Eliquis post thoracentesis     Once again, thank you for allowing me to participate in this patients care. Please do not hesitate to contact me if I could be of any further assistance. Sincerely,  Grace Gaviria MD, MS, F.A.C.C. [facility] Cardiology Specialist    [facility], [address] [place] [address]  Phone: [phone], Fax: [phone]     I believe that the risk of significant morbidity and mortality related to the patient's current medical conditions are: high. The documentation recorded by the scribe, accurately and completely reflects the services I personally performed and the decisions made by me. Beulah Jameson MD, MS, F.A.C.C.  March 24, 2020

## 2020-03-24 NOTE — PROGRESS NOTES
Left a message for Kaitlin to return my call about admission to go there skilled.   Adilia Sensor, [facility]

## 2020-03-24 NOTE — PROGRESS NOTES
Patient is set up to go to [facility] in [place] on discharge. The hens was done for placement to go to SNF.   JIMMIE Yanes

## 2020-03-25 ENCOUNTER — APPOINTMENT (OUTPATIENT)
Dept: GENERAL RADIOLOGY | Age: 85
DRG: 871 | End: 2020-03-25
Payer: MEDICARE

## 2020-03-25 VITALS
HEIGHT: 65 IN | TEMPERATURE: 97.1 F | BODY MASS INDEX: 21.76 KG/M2 | HEART RATE: 55 BPM | OXYGEN SATURATION: 100 % | WEIGHT: 130.6 LBS | SYSTOLIC BLOOD PRESSURE: 107 MMHG | DIASTOLIC BLOOD PRESSURE: 56 MMHG | RESPIRATION RATE: 20 BRPM

## 2020-03-25 LAB
ABSOLUTE EOS #: 0.13 K/UL (ref 0–0.44)
ABSOLUTE IMMATURE GRANULOCYTE: 0.04 K/UL (ref 0–0.3)
ABSOLUTE LYMPH #: 1.01 K/UL (ref 1.1–3.7)
ABSOLUTE MONO #: 0.72 K/UL (ref 0.1–1.2)
ALBUMIN SERPL-MCNC: 2.9 G/DL (ref 3.5–5.2)
ALBUMIN/GLOBULIN RATIO: 0.9 (ref 1–2.5)
ALP BLD-CCNC: 42 U/L (ref 35–104)
ALT SERPL-CCNC: 9 U/L (ref 5–33)
ANION GAP SERPL CALCULATED.3IONS-SCNC: 13 MMOL/L (ref 9–17)
AST SERPL-CCNC: 19 U/L
BASOPHILS # BLD: 1 % (ref 0–2)
BASOPHILS ABSOLUTE: 0.05 K/UL (ref 0–0.2)
BILIRUB SERPL-MCNC: 0.76 MG/DL (ref 0.3–1.2)
BUN BLDV-MCNC: 16 MG/DL (ref 8–23)
BUN/CREAT BLD: 16 (ref 9–20)
CALCIUM SERPL-MCNC: 8.5 MG/DL (ref 8.6–10.4)
CHLORIDE BLD-SCNC: 99 MMOL/L (ref 98–107)
CO2: 21 MMOL/L (ref 20–31)
CREAT SERPL-MCNC: 0.97 MG/DL (ref 0.5–0.9)
DIFFERENTIAL TYPE: ABNORMAL
EOSINOPHILS RELATIVE PERCENT: 2 % (ref 1–4)
GFR AFRICAN AMERICAN: >60 ML/MIN
GFR NON-AFRICAN AMERICAN: 54 ML/MIN
GFR SERPL CREATININE-BSD FRML MDRD: ABNORMAL ML/MIN/{1.73_M2}
GFR SERPL CREATININE-BSD FRML MDRD: ABNORMAL ML/MIN/{1.73_M2}
GLUCOSE BLD-MCNC: 121 MG/DL (ref 74–100)
GLUCOSE BLD-MCNC: 130 MG/DL (ref 70–99)
HCT VFR BLD CALC: 30.3 % (ref 36.3–47.1)
HEMOGLOBIN: 10.1 G/DL (ref 11.9–15.1)
IMMATURE GRANULOCYTES: 1 %
LYMPHOCYTES # BLD: 12 % (ref 24–43)
MAGNESIUM: 1.8 MG/DL (ref 1.6–2.6)
MCH RBC QN AUTO: 32.6 PG (ref 25.2–33.5)
MCHC RBC AUTO-ENTMCNC: 33.3 G/DL (ref 28.4–34.8)
MCV RBC AUTO: 97.7 FL (ref 82.6–102.9)
MONOCYTES # BLD: 9 % (ref 3–12)
NRBC AUTOMATED: 0 PER 100 WBC
PDW BLD-RTO: 14.4 % (ref 11.8–14.4)
PLATELET # BLD: 190 K/UL (ref 138–453)
PLATELET ESTIMATE: ABNORMAL
PMV BLD AUTO: 11.1 FL (ref 8.1–13.5)
POTASSIUM SERPL-SCNC: 3.8 MMOL/L (ref 3.7–5.3)
RBC # BLD: 3.1 M/UL (ref 3.95–5.11)
RBC # BLD: ABNORMAL 10*6/UL
SEG NEUTROPHILS: 75 % (ref 36–65)
SEGMENTED NEUTROPHILS ABSOLUTE COUNT: 6.28 K/UL (ref 1.5–8.1)
SODIUM BLD-SCNC: 133 MMOL/L (ref 135–144)
TOTAL PROTEIN: 6.1 G/DL (ref 6.4–8.3)
WBC # BLD: 8.2 K/UL (ref 3.5–11.3)
WBC # BLD: ABNORMAL 10*3/UL

## 2020-03-25 PROCEDURE — 97110 THERAPEUTIC EXERCISES: CPT

## 2020-03-25 PROCEDURE — 82947 ASSAY GLUCOSE BLOOD QUANT: CPT

## 2020-03-25 PROCEDURE — 2580000003 HC RX 258: Performed by: NURSE PRACTITIONER

## 2020-03-25 PROCEDURE — 83735 ASSAY OF MAGNESIUM: CPT

## 2020-03-25 PROCEDURE — 6370000000 HC RX 637 (ALT 250 FOR IP): Performed by: NURSE PRACTITIONER

## 2020-03-25 PROCEDURE — 97535 SELF CARE MNGMENT TRAINING: CPT

## 2020-03-25 PROCEDURE — 99232 SBSQ HOSP IP/OBS MODERATE 35: CPT | Performed by: FAMILY MEDICINE

## 2020-03-25 PROCEDURE — 71045 X-RAY EXAM CHEST 1 VIEW: CPT

## 2020-03-25 PROCEDURE — 6370000000 HC RX 637 (ALT 250 FOR IP): Performed by: FAMILY MEDICINE

## 2020-03-25 PROCEDURE — 36415 COLL VENOUS BLD VENIPUNCTURE: CPT

## 2020-03-25 PROCEDURE — 6360000002 HC RX W HCPCS: Performed by: NURSE PRACTITIONER

## 2020-03-25 PROCEDURE — 85025 COMPLETE CBC W/AUTO DIFF WBC: CPT

## 2020-03-25 PROCEDURE — 97116 GAIT TRAINING THERAPY: CPT

## 2020-03-25 PROCEDURE — 80053 COMPREHEN METABOLIC PANEL: CPT

## 2020-03-25 RX ORDER — FUROSEMIDE 20 MG/1
20 TABLET ORAL DAILY
Qty: 60 TABLET | Refills: 3 | DISCHARGE
Start: 2020-03-25 | End: 2020-05-08 | Stop reason: SDUPTHER

## 2020-03-25 RX ORDER — DILTIAZEM HYDROCHLORIDE 180 MG/1
180 CAPSULE, COATED, EXTENDED RELEASE ORAL DAILY
Qty: 30 CAPSULE | Refills: 3 | DISCHARGE
Start: 2020-03-26 | End: 2020-05-08 | Stop reason: SDUPTHER

## 2020-03-25 RX ORDER — PIPERACILLIN SODIUM, TAZOBACTAM SODIUM 3; .375 G/15ML; G/15ML
3.38 INJECTION, POWDER, LYOPHILIZED, FOR SOLUTION INTRAVENOUS EVERY 8 HOURS
Qty: 12 EACH | Refills: 0 | Status: SHIPPED | OUTPATIENT
Start: 2020-03-25 | End: 2020-03-29

## 2020-03-25 RX ORDER — METOPROLOL TARTRATE 50 MG/1
50 TABLET, FILM COATED ORAL 2 TIMES DAILY
Qty: 60 TABLET | Refills: 3 | DISCHARGE
Start: 2020-03-25 | End: 2020-05-08 | Stop reason: SDUPTHER

## 2020-03-25 RX ADMIN — SPIRONOLACTONE 25 MG: 25 TABLET, FILM COATED ORAL at 09:05

## 2020-03-25 RX ADMIN — NYSTATIN 500000 UNITS: 100000 SUSPENSION ORAL at 12:08

## 2020-03-25 RX ADMIN — METOPROLOL TARTRATE 50 MG: 50 TABLET, FILM COATED ORAL at 09:05

## 2020-03-25 RX ADMIN — PIPERACILLIN SODIUM,TAZOBACTAM SODIUM 3.38 G: 3; .375 INJECTION, POWDER, FOR SOLUTION INTRAVENOUS at 05:04

## 2020-03-25 RX ADMIN — DILTIAZEM HYDROCHLORIDE 180 MG: 180 CAPSULE, COATED, EXTENDED RELEASE ORAL at 09:05

## 2020-03-25 RX ADMIN — FLUOXETINE 20 MG: 20 CAPSULE ORAL at 09:05

## 2020-03-25 RX ADMIN — Medication 10 ML: at 09:11

## 2020-03-25 RX ADMIN — PIPERACILLIN SODIUM,TAZOBACTAM SODIUM 3.38 G: 3; .375 INJECTION, POWDER, FOR SOLUTION INTRAVENOUS at 12:10

## 2020-03-25 RX ADMIN — FUROSEMIDE 20 MG: 10 INJECTION, SOLUTION INTRAMUSCULAR; INTRAVENOUS at 09:05

## 2020-03-25 RX ADMIN — FLUCONAZOLE 100 MG: 100 TABLET ORAL at 09:05

## 2020-03-25 RX ADMIN — NYSTATIN 500000 UNITS: 100000 SUSPENSION ORAL at 09:05

## 2020-03-25 RX ADMIN — INSULIN LISPRO 1 UNITS: 100 INJECTION, SOLUTION INTRAVENOUS; SUBCUTANEOUS at 12:04

## 2020-03-25 ASSESSMENT — PAIN SCALES - GENERAL: PAINLEVEL_OUTOF10: 0

## 2020-03-25 NOTE — PROGRESS NOTES
tile  Device: Rolling Walker  Assistance: Moderate assistance  Quality of Gait: Verbal cues for sequencing  Distance: 20 ft + 10   Comments: Pt has a fear of falling. Max cues for directional changes. Exercises  Straight Leg Raise: x10  Heelslides: x10  Hip Flexion: x10  Hip Abduction: x10  Knee Long Arc Quad: x10  Ankle Pumps: x10  Comments: Pt with poor understanding of commands and visual cues. Physical assist required with each exercise to ensure completion. Goals  Short term goals  Time Frame for Short term goals: 10 days  Short term goal 1: Patient will amb 13' with FWW, CGA, without LOB  Short term goal 2: Patient will perform transfers with minimal assistance  Short term goal 3: Patient will perform bed mobility with SBA  Short term goal 4: Patient will tolerate 20-30 minutes of therex/act to improve endurance for ADLs  Patient Goals   Patient goals : Feel better    Plan    Plan  Times per week: 7  Times per day: Twice a day  Plan Comment: 1x/day on weekends  Safety Devices  Type of devices:  All fall risk precautions in place, Call light within reach, Gait belt, Left in chair, Chair alarm in place(Alarm off upon entry but reapplied upon departure.)     Therapy Time   Individual Concurrent Group Co-treatment   Time In 1328         Time Out 1359         Minutes [id number], PTA

## 2020-03-25 NOTE — DISCHARGE SUMMARY
Discharge Summary    Jose Gibson  :  1933  MRN:  [id number]    Admit date:  3/20/2020      Discharge date: 3/25/2020     Admitting Physician:  Lovely Saldivar MD    Discharge Diagnoses:    Principal Problem:    Acute on chronic combined systolic and diastolic HF (heart failure), NYHA class 4 (HCC)  Active Problems:    Diabetes mellitus without complication (HCC)    Essential hypertension    Paroxysmal atrial fibrillation (HCC)    Ischemic stroke (HCC)    Septic shock (HCC)    Bradycardia    Pneumonia of right middle lobe due to infectious organism (HCC)    Pleural effusion, bilateral  Resolved Problems:    * No resolved hospital problems. *      Hospital Course:   Jose Gibson is a 80 y.o. female admitted with increasing confusion for 24 hours along with hypotension and bradycardia. In the ER she was given Atropine and IV fluids. She also was found to have bilateral pleural effusions and atelectasis with possible PN. Her lactic was elevated and she was admitted for Septic Shock. Cardiology was consulted. Patient was in Atrial Fibrillation. Medications were adjusted accordingly and the patient has improved nicely. We did repeat a chest xray today which looks better according to Dr. Crawford Jeans. I will discharge her to Rehab today. She will continue with IV Zosyn for 4 more days and recheck a Chest Xray in 1 week. She will be going to [facility] in [place]. I did speak with Khloe the POA and daughter, she did not realize that Dr. Issac Quintanilla would not be following her or in charge of her care at the [facility] facility. She just spoke to her sister and niece. Khloe agrees that she should be where Dr. Issac Quintanilla can see and treat her, however she is \"tired\" of fighting with her siblings. She said that her other sister, who is also a pt of Dr. Brown Rodrigez, and niece said \"just bring her over here\". They realize that the [facility] doctor will be managing her care.      Consultants:  Dr. Crawford Jeans, cardiology    Procedures: none    Complications: none    Discharge Condition: stable    Exam:  GEN:  alert, disoriented and frail-appearing  EYES: No gross abnormalities. , PERRL and EOMI  NECK: normal, supple, no lymphadenopathy,  no carotid bruits  PULM: clear to auscultation bilaterally- no wheezes, rales or rhonchi, normal air movement, no respiratory distress  COR: no murmurs, no gallops, irregularly irregular, S1 normal and S2 normal  ABD:  soft, non-tender, non-distended, normal bowel sounds, no masses or organomegaly  EXT:   no cyanosis, clubbing or edema present    NEURO: follows commands, SILVA, no deficits  SKIN:  no rashes or significant lesions    Significant Diagnostic Studies:   Lab Results   Component Value Date    WBC 8.2 03/25/2020    HGB 10.1 (L) 03/25/2020     03/25/2020       Lab Results   Component Value Date    BUN 16 03/25/2020    CREATININE 0.97 (H) 03/25/2020     (L) 03/25/2020    K 3.8 03/25/2020    CALCIUM 8.5 (L) 03/25/2020    CL 99 03/25/2020    CO2 21 03/25/2020    LABGLOM 54 (L) 03/25/2020       Lab Results   Component Value Date    WBCUA 0 TO 2 03/20/2020    RBCUA None 03/20/2020    EPITHUA 0 TO 2 03/20/2020    LEUKOCYTESUR NEGATIVE 03/20/2020    SPECGRAV 1.020 03/20/2020    GLUCOSEU NEGATIVE 03/20/2020    KETUA NEGATIVE 03/20/2020    PROTEINU NEGATIVE 03/20/2020    HGBUR NEGATIVE 03/20/2020    CASTUA NOT REPORTED 03/20/2020    CRYSTUA NOT REPORTED 03/20/2020    BACTERIA TRACE (A) 03/20/2020    YEAST NOT REPORTED 03/20/2020       Xr Hip Right (2-3 Views)    Result Date: 3/20/2020  EXAMINATION: TWO XRAY VIEWS OF THE RIGHT HIP 3/20/2020 12:44 pm COMPARISON: None. HISTORY: ORDERING SYSTEM PROVIDED HISTORY: Pain TECHNOLOGIST PROVIDED HISTORY: Pain Alter mental status. FINDINGS: The femoral head is well circumscribed and situated within the acetabulum. Mild to moderate arthritic changes are present in the right hip with medial inferior joint space narrowing and mild marginal acetabular spurring.   No pericholecystic fluid or inflammatory change evident. The kidneys, spleen, adrenal glands and pancreas appear unremarkable. Stable benign left adrenal adenoma requires no additional evaluation or follow-up, 2 cm axial series 5, image 26 as seen on prior study in 2009. GI/Bowel: No diffuse or focal bowel wall thickening evident. No inflammatory changes evident. No obstruction is seen. The appendix is visualized right lower quadrant, unremarkable in appearance. Pelvis: The uterus and adnexal structures appear unremarkable. Urinary bladder is partially filled, unremarkable appearance. No adenopathy or free fluid. Peritoneum/Retroperitoneum: 8 mm splenic artery aneurysm demonstrated, densely calcified. Sequela from ventral herniorrhaphy. Calcific atherosclerotic disease aorta. No aneurysm. Unremarkable appearance of the IVC. No adenopathy or fluid. Bones/Soft Tissues: No acute superficial soft tissue or osseous structure abnormality evident. Degenerative changes lumbar spine with grade 1 degenerative anterolisthesis L4 on L5. No fracture evident. 1. CT CHEST: Moderate volume right pleural effusion and small volume left pleural effusion with bibasilar subsegmental atelectasis or pneumonia. 2. Mild calcific atherosclerosis aorta and coronary arteries. 3. No acute traumatic abnormality is evident. 4. CT ABDOMEN/PELVIS: No CT evidence of an acute intra-abdominal or intrapelvic process. 5. No acute traumatic abnormality evident. 6. Cholelithiasis without definite CT findings of acute cholecystitis. 7. Densely calcified 8 mm splenic artery aneurysm of doubtful clinical significance. 8.  No findings to suggest acute appendicitis; no ureter calculus or hydronephrosis.        Assessment and Plan:  Patient Active Problem List    Diagnosis Date Noted    Acute on chronic combined systolic and diastolic HF (heart failure), NYHA class 4 ([facility]      Priority: High    Encounter for current long-term use of anticoagulants

## 2020-03-25 NOTE — PROGRESS NOTES
Progress Note    SUBJECTIVE:  F/u on SOB    OBJECTIVE:  Patient sitting up in the chair with aide at side assisting with feeding. Denies complaints. No distress at this time. Afebrile. Monitor shows AFib 65-79. No ectopy. No blood in stools reported by nursing. Vitals:   Vitals:    03/25/20 0715   BP: 133/69   Pulse: 90   Resp: 18   Temp: 97 °F (36.1 °C)   SpO2: 96%     Weight: 130 lb 9.6 oz (59.2 kg)   Height: 5' 5\" (165.1 cm)   -----------------------------------------------------------------  Exam:    CONSTITUTIONAL:  awake, alert, cooperative, no apparent distress, and appears stated age  EYES:  Lids and lashes normal, pupils equal, round and reactive to light, extra ocular muscles intact, sclera clear, conjunctiva normal  ENT:  normocepalic, without obvious abnormality, atraumatic  NECK:  supple, symmetrical, trachea midline, skin normal and no stridor  HEMATOLOGIC/LYMPHATICS:  no cervical lymphadenopathy and no supraclavicular lymphadenopathy  LUNGS:  No increased work of breathing, good air exchange, clear to auscultation bilaterally, no crackles or wheezing  CARDIOVASCULAR:  irregularly irregular rhythm, normal S1 and S2 and no edema  ABDOMEN:  No scars, normal bowel sounds, soft, non-distended, non-tender, no masses palpated, no hepatosplenomegally  MUSCULOSKELETAL:  there is no redness, warmth, or swelling of the joints  full range of motion noted  NEUROLOGIC:  Mental Status Exam:  Level of Alertness:   awake  Orientation:   person  Memory:   abnormal - does not answer appropriately, confused.   Mumbling talk and   SKIN:  no bruising or bleeding, normal skin color, texture, turgor, no redness, warmth, or swelling, no rashes and no lesions  EXT:     no cyanosis, clubbing or edema present    -----------------------------------------------------------------  Diagnostic Data: Reviewed    ASSESSMENT:   Principal Problem:    Acute on chronic combined systolic and diastolic HF (heart failure), NYHA class 4 (HCC)  Active Problems:    Diabetes mellitus without complication (HCC)    Essential hypertension    Paroxysmal atrial fibrillation (HCC)    Ischemic stroke (HCC)    Septic shock (HCC)    Bradycardia    Pneumonia of right middle lobe due to infectious organism ([address]    Pleural effusion, bilateral  Resolved Problems:    * No resolved hospital problems. *        PLAN:  · Thoracentesis today per Cardiology   · Discharge Planning tomorrow to [address]  · I spoke with Khloe the POA and daughter, she did not realize that Dr. Jackie Vazquez would not be following her or in charge of her care at the [facility] facility. She just spoke to her sister and niece. Khloe agrees that she should be where Dr. Jackie Vazquez can see and treat her, however she is \"tired\" of fighting with her siblings. She said that her other sister, who is also a pt of Dr. Inga Patel, and niece said \"just bring her over here\". They realize that the [facility] doctor will be managing her care.      ALEYDA Neville, NP-C

## 2020-03-25 NOTE — PROGRESS NOTES
Priority: High    Septic shock (HCC) 03/20/2020    Bradycardia 03/20/2020    Pneumonia of right middle lobe due to infectious organism ([facility] 03/20/2020    Pleural effusion, bilateral 03/20/2020    Atrial fibrillation with rapid ventricular response ([facility] 01/24/2020    Ischemic stroke (HCC)     Paroxysmal atrial fibrillation ([facility] 08/02/2017    Hyperlipidemia 05/03/2016    Right shoulder pain 05/03/2016    Recurrent major depressive disorder, in partial remission ([facility]     [facility]     Osteoarthritis     Essential hypertension         PLAN:   Acute on chronic diastolic heart failure: New York Heart Association Class: III-IV. Up 6 lbs since admission but down 1 lb since starting lasix yesterday. Continue   Beta Blocker: Continue Metoprolol tartrate (Lopressor) 50 mg bid.  Diuretics: Continue furosemide (Lasix) 20 mg every morning.  Heart failure counseling: I advised them to try and keep their legs up whenever possible and to limit salt in their diet. · Moderate Right Plural Effusion: Improved on CXR. Will continue to monitor. · Paroxysmal atrial fibrillation with RVR[de-identified]   · Calcium Channel Blocker: Continue diltiazem CD (Cardizem CD) 180 mg once daily. · Paroxysmal Atrial Fibrillation: Rate Control Symptomatic. Currently in normal sinus rhythm and no known recurrence  · Beta Blocker: Continue Metoprolol tartrate (Lopressor) 50 mg bid. · Calcium Channel Blocker: Continue diltiazem CD (Cardizem CD) 180 mg once daily. · Stroke Risk: CHADS2-VASc Score: 7/9 (9.6% stroke risk)  ?  [id number] Score for Atrial Fibrillation Stroke Risk    Risk   Factors   Component Value   C CHF No 0   H HTN Yes 1   A2 Age >= 76 Yes,  (80 y.o.) 2   D DM Yes 1   S2 Prior Stroke/TIA Yes 2   V Vascular Disease No 0   A Age 74-69 No,  (80 y.o.) 0   Sc Sex female 1     [id number]  Score   7     § Click here for a link to the UpToDate guideline \"Atrial Fibrillation: Anticoagulation therapy to prevent embolization  § Disclaimer: Risk Score calculation is dependent on accuracy of patient problem list and past encounter diagnosis. § Anticoagulation: Restart Apixaban (Eliquis) for   § Canceling the thoracentesis that she is scheduled for due to her CXR being normal     · History of Ischemic Stroke:  § Beta Blocker: Continue Metoprolol tartrate (Lopressor) 50 mg bid. § Calcium Channel Blocker: Continue diltiazem CD (Cardizem CD) 180 mg once daily. § Restart Eliquis post thoracentesis     Once again, thank you for allowing me to participate in this patients care. Please do not hesitate to contact me if I could be of any further assistance. Sincerely,  Radha Kohler. Khadra LOZANO, MS, F.A.C.C. [facility] Cardiology Specialist    [address] [place] [address]  Phone: [phone], Fax: [phone]     I believe that the risk of significant morbidity and mortality related to the patient's current medical conditions are: Intermediate. The documentation recorded by the scribe, accurately and completely reflects the services I personally performed and the decisions made by me. Rajesh Mcconnell MD, MS, F.A.C.C.  March 25, 2020

## 2020-03-26 ENCOUNTER — CARE COORDINATION (OUTPATIENT)
Dept: CARE COORDINATION | Age: 85
End: 2020-03-26

## 2020-03-26 LAB
CULTURE: NORMAL
CULTURE: NORMAL
GLUCOSE BLD-MCNC: 168 MG/DL (ref 74–100)
Lab: NORMAL
Lab: NORMAL
SPECIMEN DESCRIPTION: NORMAL
SPECIMEN DESCRIPTION: NORMAL

## 2020-05-04 ENCOUNTER — CARE COORDINATION (OUTPATIENT)
Dept: CASE MANAGEMENT | Age: 85
End: 2020-05-04

## 2020-05-04 PROCEDURE — 1111F DSCHRG MED/CURRENT MED MERGE: CPT | Performed by: FAMILY MEDICINE

## 2020-05-06 ENCOUNTER — CARE COORDINATION (OUTPATIENT)
Dept: CASE MANAGEMENT | Age: 85
End: 2020-05-06

## 2020-05-16 ENCOUNTER — HOSPITAL ENCOUNTER (INPATIENT)
Age: 85
LOS: 2 days | Discharge: HOSPICE | DRG: 682 | End: 2020-05-18
Attending: EMERGENCY MEDICINE | Admitting: FAMILY MEDICINE
Payer: MEDICARE

## 2020-05-16 ENCOUNTER — APPOINTMENT (OUTPATIENT)
Dept: GENERAL RADIOLOGY | Age: 85
DRG: 682 | End: 2020-05-16
Payer: MEDICARE

## 2020-05-16 PROBLEM — R41.82 ALTERED MENTAL STATUS: Status: ACTIVE | Noted: 2020-05-16

## 2020-05-16 PROBLEM — R41.82 ACUTE ALTERATION IN MENTAL STATUS: Status: ACTIVE | Noted: 2020-05-16

## 2020-05-16 LAB
-: ABNORMAL
ABSOLUTE EOS #: <0.03 K/UL (ref 0–0.44)
ABSOLUTE IMMATURE GRANULOCYTE: 0.05 K/UL (ref 0–0.3)
ABSOLUTE LYMPH #: 1.01 K/UL (ref 1.1–3.7)
ABSOLUTE MONO #: 0.57 K/UL (ref 0.1–1.2)
AMORPHOUS: ABNORMAL
ANION GAP SERPL CALCULATED.3IONS-SCNC: 10 MMOL/L (ref 9–17)
BACTERIA: ABNORMAL
BASOPHILS # BLD: 0 % (ref 0–2)
BASOPHILS ABSOLUTE: <0.03 K/UL (ref 0–0.2)
BILIRUBIN URINE: NEGATIVE
BNP INTERPRETATION: ABNORMAL
BUN BLDV-MCNC: 76 MG/DL (ref 8–23)
BUN/CREAT BLD: 34 (ref 9–20)
CALCIUM SERPL-MCNC: 9.5 MG/DL (ref 8.6–10.4)
CASTS UA: ABNORMAL /LPF
CHLORIDE BLD-SCNC: 105 MMOL/L (ref 98–107)
CO2: 22 MMOL/L (ref 20–31)
COLOR: YELLOW
COMMENT UA: ABNORMAL
CREAT SERPL-MCNC: 2.24 MG/DL (ref 0.5–0.9)
CRYSTALS, UA: ABNORMAL /HPF
DIFFERENTIAL TYPE: ABNORMAL
EOSINOPHILS RELATIVE PERCENT: 0 % (ref 1–4)
EPITHELIAL CELLS UA: ABNORMAL /HPF (ref 0–25)
GFR AFRICAN AMERICAN: 25 ML/MIN
GFR NON-AFRICAN AMERICAN: 21 ML/MIN
GFR SERPL CREATININE-BSD FRML MDRD: ABNORMAL ML/MIN/{1.73_M2}
GFR SERPL CREATININE-BSD FRML MDRD: ABNORMAL ML/MIN/{1.73_M2}
GLUCOSE BLD-MCNC: 261 MG/DL (ref 70–99)
GLUCOSE URINE: NEGATIVE
HCT VFR BLD CALC: 38.9 % (ref 36.3–47.1)
HEMOGLOBIN: 12.6 G/DL (ref 11.9–15.1)
IMMATURE GRANULOCYTES: 1 %
KETONES, URINE: NEGATIVE
LEUKOCYTE ESTERASE, URINE: NEGATIVE
LYMPHOCYTES # BLD: 10 % (ref 24–43)
MCH RBC QN AUTO: 32.7 PG (ref 25.2–33.5)
MCHC RBC AUTO-ENTMCNC: 32.4 G/DL (ref 28.4–34.8)
MCV RBC AUTO: 101 FL (ref 82.6–102.9)
MONOCYTES # BLD: 6 % (ref 3–12)
MUCUS: ABNORMAL
NITRITE, URINE: NEGATIVE
NRBC AUTOMATED: 0 PER 100 WBC
OTHER OBSERVATIONS UA: ABNORMAL
PDW BLD-RTO: 14.1 % (ref 11.8–14.4)
PH UA: 5.5 (ref 5–9)
PLATELET # BLD: 197 K/UL (ref 138–453)
PLATELET ESTIMATE: ABNORMAL
PMV BLD AUTO: 12.5 FL (ref 8.1–13.5)
POTASSIUM SERPL-SCNC: 5.4 MMOL/L (ref 3.7–5.3)
PRO-BNP: 6292 PG/ML
PROTEIN UA: NEGATIVE
RBC # BLD: 3.85 M/UL (ref 3.95–5.11)
RBC # BLD: ABNORMAL 10*6/UL
RBC UA: ABNORMAL /HPF (ref 0–2)
RENAL EPITHELIAL, UA: ABNORMAL /HPF
SEG NEUTROPHILS: 83 % (ref 36–65)
SEGMENTED NEUTROPHILS ABSOLUTE COUNT: 8.57 K/UL (ref 1.5–8.1)
SODIUM BLD-SCNC: 137 MMOL/L (ref 135–144)
SPECIFIC GRAVITY UA: 1.02 (ref 1.01–1.02)
TRICHOMONAS: ABNORMAL
TROPONIN INTERP: ABNORMAL
TROPONIN INTERP: ABNORMAL
TROPONIN T: ABNORMAL NG/ML
TROPONIN T: ABNORMAL NG/ML
TROPONIN, HIGH SENSITIVITY: 60 NG/L (ref 0–14)
TROPONIN, HIGH SENSITIVITY: 66 NG/L (ref 0–14)
TURBIDITY: CLEAR
URINE HGB: NEGATIVE
UROBILINOGEN, URINE: NORMAL
WBC # BLD: 10.2 K/UL (ref 3.5–11.3)
WBC # BLD: ABNORMAL 10*3/UL
WBC UA: ABNORMAL /HPF (ref 0–5)
YEAST: ABNORMAL

## 2020-05-16 PROCEDURE — 2580000003 HC RX 258: Performed by: EMERGENCY MEDICINE

## 2020-05-16 PROCEDURE — 85025 COMPLETE CBC W/AUTO DIFF WBC: CPT

## 2020-05-16 PROCEDURE — 96374 THER/PROPH/DIAG INJ IV PUSH: CPT

## 2020-05-16 PROCEDURE — 2580000003 HC RX 258: Performed by: INTERNAL MEDICINE

## 2020-05-16 PROCEDURE — 83880 ASSAY OF NATRIURETIC PEPTIDE: CPT

## 2020-05-16 PROCEDURE — 93005 ELECTROCARDIOGRAM TRACING: CPT | Performed by: EMERGENCY MEDICINE

## 2020-05-16 PROCEDURE — 84484 ASSAY OF TROPONIN QUANT: CPT

## 2020-05-16 PROCEDURE — 99285 EMERGENCY DEPT VISIT HI MDM: CPT

## 2020-05-16 PROCEDURE — 71045 X-RAY EXAM CHEST 1 VIEW: CPT

## 2020-05-16 PROCEDURE — G0378 HOSPITAL OBSERVATION PER HR: HCPCS

## 2020-05-16 PROCEDURE — 87040 BLOOD CULTURE FOR BACTERIA: CPT

## 2020-05-16 PROCEDURE — 36415 COLL VENOUS BLD VENIPUNCTURE: CPT

## 2020-05-16 PROCEDURE — 80048 BASIC METABOLIC PNL TOTAL CA: CPT

## 2020-05-16 PROCEDURE — 6370000000 HC RX 637 (ALT 250 FOR IP): Performed by: INTERNAL MEDICINE

## 2020-05-16 PROCEDURE — 81001 URINALYSIS AUTO W/SCOPE: CPT

## 2020-05-16 PROCEDURE — 6360000002 HC RX W HCPCS: Performed by: EMERGENCY MEDICINE

## 2020-05-16 RX ORDER — METOPROLOL TARTRATE 50 MG/1
50 TABLET, FILM COATED ORAL 2 TIMES DAILY
Status: DISCONTINUED | OUTPATIENT
Start: 2020-05-16 | End: 2020-05-17

## 2020-05-16 RX ORDER — HYDRALAZINE HYDROCHLORIDE 10 MG/1
10 TABLET, FILM COATED ORAL 2 TIMES DAILY PRN
Status: DISCONTINUED | OUTPATIENT
Start: 2020-05-16 | End: 2020-05-19 | Stop reason: HOSPADM

## 2020-05-16 RX ORDER — ONDANSETRON 2 MG/ML
4 INJECTION INTRAMUSCULAR; INTRAVENOUS ONCE
Status: COMPLETED | OUTPATIENT
Start: 2020-05-16 | End: 2020-05-16

## 2020-05-16 RX ORDER — ACETAMINOPHEN 500 MG
500 TABLET ORAL EVERY 4 HOURS PRN
Status: DISCONTINUED | OUTPATIENT
Start: 2020-05-16 | End: 2020-05-18

## 2020-05-16 RX ORDER — 0.9 % SODIUM CHLORIDE 0.9 %
500 INTRAVENOUS SOLUTION INTRAVENOUS ONCE
Status: COMPLETED | OUTPATIENT
Start: 2020-05-16 | End: 2020-05-17

## 2020-05-16 RX ORDER — SODIUM CHLORIDE 0.9 % (FLUSH) 0.9 %
10 SYRINGE (ML) INJECTION EVERY 12 HOURS SCHEDULED
Status: DISCONTINUED | OUTPATIENT
Start: 2020-05-16 | End: 2020-05-19 | Stop reason: HOSPADM

## 2020-05-16 RX ORDER — FLUOXETINE HYDROCHLORIDE 20 MG/1
20 CAPSULE ORAL DAILY
Status: DISCONTINUED | OUTPATIENT
Start: 2020-05-16 | End: 2020-05-18

## 2020-05-16 RX ORDER — MORPHINE SULFATE 2 MG/ML
2 INJECTION, SOLUTION INTRAMUSCULAR; INTRAVENOUS ONCE
Status: DISCONTINUED | OUTPATIENT
Start: 2020-05-16 | End: 2020-05-19 | Stop reason: HOSPADM

## 2020-05-16 RX ORDER — ONDANSETRON 2 MG/ML
4 INJECTION INTRAMUSCULAR; INTRAVENOUS EVERY 6 HOURS PRN
Status: DISCONTINUED | OUTPATIENT
Start: 2020-05-16 | End: 2020-05-19 | Stop reason: HOSPADM

## 2020-05-16 RX ORDER — PROMETHAZINE HYDROCHLORIDE 25 MG/1
12.5 TABLET ORAL EVERY 6 HOURS PRN
Status: DISCONTINUED | OUTPATIENT
Start: 2020-05-16 | End: 2020-05-19 | Stop reason: HOSPADM

## 2020-05-16 RX ORDER — ACETAMINOPHEN 325 MG/1
650 TABLET ORAL EVERY 6 HOURS PRN
Status: DISCONTINUED | OUTPATIENT
Start: 2020-05-16 | End: 2020-05-18

## 2020-05-16 RX ORDER — SPIRONOLACTONE 25 MG/1
25 TABLET ORAL DAILY
Status: DISCONTINUED | OUTPATIENT
Start: 2020-05-16 | End: 2020-05-17

## 2020-05-16 RX ORDER — FUROSEMIDE 20 MG/1
20 TABLET ORAL DAILY
Status: DISCONTINUED | OUTPATIENT
Start: 2020-05-16 | End: 2020-05-17

## 2020-05-16 RX ORDER — DILTIAZEM HYDROCHLORIDE 180 MG/1
180 CAPSULE, COATED, EXTENDED RELEASE ORAL DAILY
Status: DISCONTINUED | OUTPATIENT
Start: 2020-05-16 | End: 2020-05-17

## 2020-05-16 RX ORDER — SODIUM CHLORIDE 0.9 % (FLUSH) 0.9 %
10 SYRINGE (ML) INJECTION PRN
Status: DISCONTINUED | OUTPATIENT
Start: 2020-05-16 | End: 2020-05-19 | Stop reason: HOSPADM

## 2020-05-16 RX ORDER — DOCUSATE SODIUM 100 MG/1
100 CAPSULE, LIQUID FILLED ORAL 2 TIMES DAILY PRN
Status: DISCONTINUED | OUTPATIENT
Start: 2020-05-16 | End: 2020-05-18

## 2020-05-16 RX ORDER — ACETAMINOPHEN 650 MG/1
650 SUPPOSITORY RECTAL EVERY 6 HOURS PRN
Status: DISCONTINUED | OUTPATIENT
Start: 2020-05-16 | End: 2020-05-19 | Stop reason: HOSPADM

## 2020-05-16 RX ORDER — ATORVASTATIN CALCIUM 20 MG/1
20 TABLET, FILM COATED ORAL NIGHTLY
Status: DISCONTINUED | OUTPATIENT
Start: 2020-05-16 | End: 2020-05-18

## 2020-05-16 RX ORDER — POLYETHYLENE GLYCOL 3350 17 G/17G
17 POWDER, FOR SOLUTION ORAL DAILY PRN
Status: DISCONTINUED | OUTPATIENT
Start: 2020-05-16 | End: 2020-05-18

## 2020-05-16 RX ADMIN — ATORVASTATIN CALCIUM 20 MG: 20 TABLET, FILM COATED ORAL at 21:26

## 2020-05-16 RX ADMIN — METOPROLOL TARTRATE 50 MG: 50 TABLET, FILM COATED ORAL at 21:26

## 2020-05-16 RX ADMIN — SODIUM CHLORIDE 500 ML: 9 INJECTION, SOLUTION INTRAVENOUS at 21:26

## 2020-05-16 RX ADMIN — APIXABAN 2.5 MG: 2.5 TABLET, FILM COATED ORAL at 21:26

## 2020-05-16 RX ADMIN — ONDANSETRON 4 MG: 2 INJECTION INTRAMUSCULAR; INTRAVENOUS at 13:41

## 2020-05-16 RX ADMIN — SODIUM CHLORIDE 500 ML: 9 INJECTION, SOLUTION INTRAVENOUS at 13:30

## 2020-05-16 ASSESSMENT — ENCOUNTER SYMPTOMS
ABDOMINAL DISTENTION: 0
VOMITING: 0
SORE THROAT: 0
ABDOMINAL PAIN: 0
SHORTNESS OF BREATH: 0
BACK PAIN: 0
NAUSEA: 0
TROUBLE SWALLOWING: 0
COUGH: 0
WHEEZING: 0
CHOKING: 0

## 2020-05-16 NOTE — ED NOTES
Dr. King Montemayor returned call. Currently speaking with Dr. Yanique Delacruz.      Ellis ABREU Reser  05/16/20 1700

## 2020-05-16 NOTE — ED PROVIDER NOTES
MEDICAL HISTORY     Past Medical History:   Diagnosis Date    Atrial fibrillation ([facility]     Dementia (HCC)     Depressive disorder, not elsewhere classified     Osteoarthrosis, unspecified whether generalized or localized, unspecified site     Pure hypercholesterolemia     Type II or unspecified type diabetes mellitus without mention of complication, not stated as uncontrolled     Unspecified essential hypertension          SURGICAL HISTORY       Past Surgical History:   Procedure Laterality Date    JOINT REPLACEMENT  1992         CURRENT MEDICATIONS       Previous Medications    ACETAMINOPHEN (TYLENOL) 500 MG TABLET    Take 500 mg by mouth every 4 hours as needed for Pain    APIXABAN (ELIQUIS) 2.5 MG TABS TABLET    Take 1 tablet by mouth 2 times daily    ATORVASTATIN (LIPITOR) 20 MG TABLET    Take 1 tablet by mouth daily    DIAPERS & SUPPLIES (GOODNITES BED MATS) MISC    USE AS NEEDED FOR INCONTINENCE    DILTIAZEM (CARDIZEM CD) 180 MG EXTENDED RELEASE CAPSULE    Take 1 capsule by mouth daily    DOCUSATE SODIUM (COLACE) 100 MG CAPSULE    Take 1 capsule by mouth 2 times daily as needed for Constipation    FLUOXETINE (PROZAC) 20 MG CAPSULE    Take 1 capsule by mouth daily Take 20 mg by mouth daily    FUROSEMIDE (LASIX) 20 MG TABLET    Take 1 tablet by mouth daily    HYDRALAZINE (APRESOLINE) 10 MG TABLET    Take 1 tablet by mouth 2 times daily as needed (90) Prn for bp >140 mm of hg    INCONTINENCE SUPPLY DISPOSABLE (ALWAYS FEMININE WIPES) MISC    USE AS NEEDED FOR INCONTINENCE    INCONTINENCE SUPPLY DISPOSABLE (ALWAYS FEMININE WIPES) MISC    USE AS NEEDED FOR INCONTINENT EPISODES    INCONTINENCE SUPPLY DISPOSABLE (DISPOSABLE BRIEF MEDIUM) MISC    USE AS NEEDED FOR INCONTINENT EPISODES    INCONTINENCE SUPPLY DISPOSABLE (FITTED BRIEFS MEDIUM) MISC    USE AS NEEDED FOR INCONTINENCE    METOPROLOL TARTRATE (LOPRESSOR) 50 MG TABLET    Take 1 tablet by mouth 2 times daily    NYSTATIN (MYCOSTATIN) 281220 UNIT/ML unspecified altered mental status type          DISPOSITION/PLAN   DISPOSITION    The patient is admitted into the hospital.    PATIENT REFERRED TO:  No follow-up provider specified.     DISCHARGE MEDICATIONS:  New Prescriptions    No medications on file              (Please note that portions ofthis note were completed with a voice recognition program.  Efforts were made to edit the dictations but occasionally words are mis-transcribed.)      Gricel Vasques MD (electronically signed)  Attending Emergency Physician          Ann Marie Rausch MD  05/16/20 4439

## 2020-05-17 PROBLEM — I95.1 ORTHOSTATIC HYPOTENSION: Status: ACTIVE | Noted: 2020-05-17

## 2020-05-17 PROBLEM — I50.32 CHRONIC DIASTOLIC CHF (CONGESTIVE HEART FAILURE), NYHA CLASS 3 (HCC): Chronic | Status: ACTIVE | Noted: 2020-05-17

## 2020-05-17 PROBLEM — N17.9 ACUTE RENAL FAILURE (ARF) (HCC): Status: ACTIVE | Noted: 2020-05-17

## 2020-05-17 PROBLEM — N17.9 ACUTE RENAL FAILURE (HCC): Status: ACTIVE | Noted: 2020-05-17

## 2020-05-17 LAB
ALBUMIN SERPL-MCNC: 3.1 G/DL (ref 3.5–5.2)
ALBUMIN/GLOBULIN RATIO: 0.8 (ref 1–2.5)
ALP BLD-CCNC: 64 U/L (ref 35–104)
ALT SERPL-CCNC: 13 U/L (ref 5–33)
ANION GAP SERPL CALCULATED.3IONS-SCNC: 9 MMOL/L (ref 9–17)
AST SERPL-CCNC: 13 U/L
BILIRUB SERPL-MCNC: 0.61 MG/DL (ref 0.3–1.2)
BUN BLDV-MCNC: 76 MG/DL (ref 8–23)
BUN/CREAT BLD: 37 (ref 9–20)
CALCIUM SERPL-MCNC: 8.9 MG/DL (ref 8.6–10.4)
CHLORIDE BLD-SCNC: 113 MMOL/L (ref 98–107)
CO2: 21 MMOL/L (ref 20–31)
CREAT SERPL-MCNC: 2.03 MG/DL (ref 0.5–0.9)
EKG ATRIAL RATE: 111 BPM
EKG ATRIAL RATE: 136 BPM
EKG ATRIAL RATE: 68 BPM
EKG Q-T INTERVAL: 310 MS
EKG Q-T INTERVAL: 400 MS
EKG Q-T INTERVAL: 430 MS
EKG QRS DURATION: 100 MS
EKG QRS DURATION: 100 MS
EKG QRS DURATION: 98 MS
EKG QTC CALCULATION (BAZETT): 447 MS
EKG QTC CALCULATION (BAZETT): 480 MS
EKG QTC CALCULATION (BAZETT): 486 MS
EKG R AXIS: -78 DEGREES
EKG R AXIS: -81 DEGREES
EKG R AXIS: -82 DEGREES
EKG T AXIS: 51 DEGREES
EKG T AXIS: 53 DEGREES
EKG T AXIS: 70 DEGREES
EKG VENTRICULAR RATE: 125 BPM
EKG VENTRICULAR RATE: 75 BPM
EKG VENTRICULAR RATE: 89 BPM
GFR AFRICAN AMERICAN: 28 ML/MIN
GFR NON-AFRICAN AMERICAN: 23 ML/MIN
GFR SERPL CREATININE-BSD FRML MDRD: ABNORMAL ML/MIN/{1.73_M2}
GFR SERPL CREATININE-BSD FRML MDRD: ABNORMAL ML/MIN/{1.73_M2}
GLUCOSE BLD-MCNC: 203 MG/DL (ref 70–99)
HCT VFR BLD CALC: 36.1 % (ref 36.3–47.1)
HEMOGLOBIN: 11.6 G/DL (ref 11.9–15.1)
MCH RBC QN AUTO: 33 PG (ref 25.2–33.5)
MCHC RBC AUTO-ENTMCNC: 32.1 G/DL (ref 28.4–34.8)
MCV RBC AUTO: 102.8 FL (ref 82.6–102.9)
NRBC AUTOMATED: 0 PER 100 WBC
PDW BLD-RTO: 14.2 % (ref 11.8–14.4)
PLATELET # BLD: 166 K/UL (ref 138–453)
PMV BLD AUTO: 13 FL (ref 8.1–13.5)
POTASSIUM SERPL-SCNC: 5.6 MMOL/L (ref 3.7–5.3)
RBC # BLD: 3.51 M/UL (ref 3.95–5.11)
SODIUM BLD-SCNC: 143 MMOL/L (ref 135–144)
TOTAL PROTEIN: 6.9 G/DL (ref 6.4–8.3)
WBC # BLD: 11 K/UL (ref 3.5–11.3)

## 2020-05-17 PROCEDURE — 1200000000 HC SEMI PRIVATE

## 2020-05-17 PROCEDURE — 93005 ELECTROCARDIOGRAM TRACING: CPT | Performed by: INTERNAL MEDICINE

## 2020-05-17 PROCEDURE — 6360000002 HC RX W HCPCS: Performed by: INTERNAL MEDICINE

## 2020-05-17 PROCEDURE — 80053 COMPREHEN METABOLIC PANEL: CPT

## 2020-05-17 PROCEDURE — 6370000000 HC RX 637 (ALT 250 FOR IP): Performed by: FAMILY MEDICINE

## 2020-05-17 PROCEDURE — 2580000003 HC RX 258: Performed by: FAMILY MEDICINE

## 2020-05-17 PROCEDURE — 85027 COMPLETE CBC AUTOMATED: CPT

## 2020-05-17 PROCEDURE — 2580000003 HC RX 258: Performed by: INTERNAL MEDICINE

## 2020-05-17 PROCEDURE — 94760 N-INVAS EAR/PLS OXIMETRY 1: CPT

## 2020-05-17 PROCEDURE — 36415 COLL VENOUS BLD VENIPUNCTURE: CPT

## 2020-05-17 PROCEDURE — 93010 ELECTROCARDIOGRAM REPORT: CPT | Performed by: FAMILY MEDICINE

## 2020-05-17 PROCEDURE — 99222 1ST HOSP IP/OBS MODERATE 55: CPT | Performed by: FAMILY MEDICINE

## 2020-05-17 PROCEDURE — 6370000000 HC RX 637 (ALT 250 FOR IP): Performed by: INTERNAL MEDICINE

## 2020-05-17 RX ORDER — METOPROLOL TARTRATE 100 MG/1
100 TABLET ORAL 2 TIMES DAILY
Status: DISCONTINUED | OUTPATIENT
Start: 2020-05-17 | End: 2020-05-19 | Stop reason: HOSPADM

## 2020-05-17 RX ORDER — 0.9 % SODIUM CHLORIDE 0.9 %
1000 INTRAVENOUS SOLUTION INTRAVENOUS ONCE
Status: COMPLETED | OUTPATIENT
Start: 2020-05-17 | End: 2020-05-18

## 2020-05-17 RX ORDER — DIGOXIN 0.25 MG/ML
250 INJECTION INTRAMUSCULAR; INTRAVENOUS ONCE
Status: COMPLETED | OUTPATIENT
Start: 2020-05-17 | End: 2020-05-17

## 2020-05-17 RX ORDER — 0.9 % SODIUM CHLORIDE 0.9 %
250 INTRAVENOUS SOLUTION INTRAVENOUS ONCE
Status: COMPLETED | OUTPATIENT
Start: 2020-05-17 | End: 2020-05-17

## 2020-05-17 RX ORDER — DILTIAZEM HYDROCHLORIDE 120 MG/1
120 CAPSULE, COATED, EXTENDED RELEASE ORAL DAILY
Status: DISCONTINUED | OUTPATIENT
Start: 2020-05-17 | End: 2020-05-19 | Stop reason: HOSPADM

## 2020-05-17 RX ORDER — SODIUM CHLORIDE 9 MG/ML
INJECTION, SOLUTION INTRAVENOUS CONTINUOUS
Status: ACTIVE | OUTPATIENT
Start: 2020-05-17 | End: 2020-05-18

## 2020-05-17 RX ORDER — METOPROLOL TARTRATE 50 MG/1
50 TABLET, FILM COATED ORAL ONCE
Status: COMPLETED | OUTPATIENT
Start: 2020-05-17 | End: 2020-05-17

## 2020-05-17 RX ORDER — SODIUM CHLORIDE 9 MG/ML
INJECTION, SOLUTION INTRAVENOUS CONTINUOUS
Status: ACTIVE | OUTPATIENT
Start: 2020-05-17 | End: 2020-05-17

## 2020-05-17 RX ADMIN — SODIUM CHLORIDE: 9 INJECTION, SOLUTION INTRAVENOUS at 20:17

## 2020-05-17 RX ADMIN — METOPROLOL TARTRATE 100 MG: 100 TABLET ORAL at 20:10

## 2020-05-17 RX ADMIN — APIXABAN 2.5 MG: 2.5 TABLET, FILM COATED ORAL at 20:10

## 2020-05-17 RX ADMIN — ATORVASTATIN CALCIUM 20 MG: 20 TABLET, FILM COATED ORAL at 20:11

## 2020-05-17 RX ADMIN — DIGOXIN 250 MCG: 250 INJECTION, SOLUTION INTRAMUSCULAR; INTRAVENOUS; PARENTERAL at 20:38

## 2020-05-17 RX ADMIN — SODIUM CHLORIDE 250 ML: 9 INJECTION, SOLUTION INTRAVENOUS at 15:06

## 2020-05-17 RX ADMIN — METOPROLOL TARTRATE 50 MG: 50 TABLET, FILM COATED ORAL at 11:37

## 2020-05-17 RX ADMIN — SODIUM CHLORIDE: 9 INJECTION, SOLUTION INTRAVENOUS at 09:58

## 2020-05-17 RX ADMIN — METOPROLOL TARTRATE 50 MG: 50 TABLET, FILM COATED ORAL at 09:59

## 2020-05-17 RX ADMIN — FLUOXETINE 20 MG: 20 CAPSULE ORAL at 09:59

## 2020-05-17 RX ADMIN — APIXABAN 2.5 MG: 2.5 TABLET, FILM COATED ORAL at 09:59

## 2020-05-17 RX ADMIN — SODIUM CHLORIDE 1000 ML: 9 INJECTION, SOLUTION INTRAVENOUS at 22:49

## 2020-05-17 RX ADMIN — SODIUM CHLORIDE 250 ML: 9 INJECTION, SOLUTION INTRAVENOUS at 17:06

## 2020-05-17 RX ADMIN — DILTIAZEM HYDROCHLORIDE 120 MG: 120 CAPSULE, COATED, EXTENDED RELEASE ORAL at 12:27

## 2020-05-17 NOTE — PROGRESS NOTES
Per Dr. Frank Perez, it is okay to give ordered Cardiazem at this time. Patients heart rate is about 130s after metoprolol dose.

## 2020-05-17 NOTE — H&P
edema. No evidence for effusion. No acute osseous abnormality is identified. Interval resolution of basilar opacities and effusions. No acute airspace disease identified. Assessment:    Principal Problem:    Altered mental status  Active Problems:    Atrial fibrillation with rapid ventricular response (HCC)    Chronic diastolic CHF (congestive heart failure), NYHA class 3 (HCC)    Orthostatic hypotension    Acute renal failure (HCC) secondary to diuretics / Overdiuresis    Acute renal failure (ARF) (HCC)  Resolved Problems:    * No resolved hospital problems.  *      Patient Active Problem List    Diagnosis Date Noted    Encounter for current long-term use of anticoagulants 02/10/2020     Priority: High    Chronic diastolic CHF (congestive heart failure), NYHA class 3 ([address] 05/17/2020    Orthostatic hypotension 05/17/2020    Acute renal failure ([address] secondary to diuretics / Overdiuresis 05/17/2020    Acute renal failure (ARF) ([address] 05/17/2020    Altered mental status 05/16/2020    Septic shock ([address] 03/20/2020    Bradycardia 03/20/2020    Pneumonia of right middle lobe due to infectious organism ([address] 03/20/2020    Pleural effusion, bilateral 03/20/2020    Atrial fibrillation with rapid ventricular response ([address] 01/24/2020    Ischemic stroke (HCC)     Paroxysmal atrial fibrillation ([address] 08/02/2017    Hyperlipidemia 05/03/2016    Right shoulder pain 05/03/2016    Recurrent major depressive disorder, in partial remission ([address]     Diabetes mellitus without complication ([address]     Osteoarthritis     Essential hypertension        Plan:     · This patient requires inpatient admission because of acute renal failure  · Factors affecting the medical complexity of this patient include Principal Problem:  ·   Altered mental status  · Active Problems:  ·   Atrial fibrillation with rapid ventricular response (HCC)  ·   Chronic diastolic CHF (congestive heart failure), NYHA class 3 ([address]  · Orthostatic hypotension  ·   Acute renal failure (HCC) secondary to diuretics / Overdiuresis  ·   Acute renal failure (ARF) (HCC)  · Resolved Problems:  ·   * No resolved hospital problems. *  ·   · Estimated length of stay is 3 days  · Hold lasix and aldactone  · High risk medication monitoring: zero  · Decrease cardizem  · cardiology    CORE MEASURES  Core measures including DVT prophylaxis, Code Status, Nutrition, Therapy Options, chart reviewed and advance directives reviewed at this visit.     Zeina Alonzo MD  5/17/2020, 8:42 AM

## 2020-05-17 NOTE — PROGRESS NOTES
Attempted to feed patient. Patient willing takes a bite of the food however patient only takes a small bite and after a bite makes a very sour face. Upon asking if pt didn't like the food she mumbled \"no. \" Writer attempted to give pt another bite of which pt refused. Writer managed to get pt to take a small single bite each food on the plate and take a small single sip of each drink. Pt refused rest of food at this time. Writer gave a few alternatives of which pt shook her head. Will continue to encourage sips of water. Will continue to attempt to get pt to eat. Will continue to monitor.

## 2020-05-17 NOTE — CONSULTS
address concerns as mentioned above. A caregiver was present when appropriate. Due to this being a TeleHealth encounter (During IBUXC-32 public health emergency), evaluation of the following organ systems was limited: Vitals/Constitutional/EENT/Resp/CV/GI//MS/Neuro/Skin/Heme-Lymph-Imm. Pursuant to the emergency declaration under the [address] [place] [address], [address] [place] [address] authority and the [employer] Act, this Virtual Visit was conducted with patient's (and/or legal guardian's) consent, to reduce the patient's risk of exposure to COVID-19 and provide necessary medical care. The patient (and/or legal guardian) has also been advised to contact this office for worsening conditions or problems, and seek emergency medical treatment and/or call 911 if deemed necessary. Services were provided through a video synchronous discussion virtually to substitute for in-person clinic visit. Patient and provider were located at their individual homes. --Nico Felix MD on 5/17/2020 at 11:11 AM    An electronic signature was used to authenticate this note.

## 2020-05-18 VITALS
RESPIRATION RATE: 16 BRPM | DIASTOLIC BLOOD PRESSURE: 41 MMHG | BODY MASS INDEX: 19.63 KG/M2 | TEMPERATURE: 97 F | HEART RATE: 67 BPM | OXYGEN SATURATION: 97 % | WEIGHT: 115 LBS | SYSTOLIC BLOOD PRESSURE: 82 MMHG | HEIGHT: 64 IN

## 2020-05-18 PROBLEM — E43 SEVERE MALNUTRITION (HCC): Status: ACTIVE | Noted: 2020-05-18

## 2020-05-18 PROBLEM — I69.998 WEAKNESS DUE TO OLD STROKE: Status: ACTIVE | Noted: 2020-05-18

## 2020-05-18 PROBLEM — R53.1 WEAKNESS DUE TO OLD STROKE: Status: ACTIVE | Noted: 2020-05-18

## 2020-05-18 LAB
ALBUMIN SERPL-MCNC: 2.7 G/DL (ref 3.5–5.2)
ALBUMIN/GLOBULIN RATIO: 0.7 (ref 1–2.5)
ALP BLD-CCNC: 81 U/L (ref 35–104)
ALT SERPL-CCNC: 9 U/L (ref 5–33)
ANION GAP SERPL CALCULATED.3IONS-SCNC: 10 MMOL/L (ref 9–17)
AST SERPL-CCNC: 13 U/L
BILIRUB SERPL-MCNC: 0.77 MG/DL (ref 0.3–1.2)
BUN BLDV-MCNC: 84 MG/DL (ref 8–23)
BUN/CREAT BLD: 39 (ref 9–20)
CALCIUM SERPL-MCNC: 8.3 MG/DL (ref 8.6–10.4)
CHLORIDE BLD-SCNC: 117 MMOL/L (ref 98–107)
CO2: 16 MMOL/L (ref 20–31)
CREAT SERPL-MCNC: 2.15 MG/DL (ref 0.5–0.9)
DATE, STOOL #1: ABNORMAL
DATE, STOOL #2: ABNORMAL
DATE, STOOL #3: ABNORMAL
GFR AFRICAN AMERICAN: 26 ML/MIN
GFR NON-AFRICAN AMERICAN: 22 ML/MIN
GFR SERPL CREATININE-BSD FRML MDRD: ABNORMAL ML/MIN/{1.73_M2}
GFR SERPL CREATININE-BSD FRML MDRD: ABNORMAL ML/MIN/{1.73_M2}
GLUCOSE BLD-MCNC: 176 MG/DL (ref 70–99)
HCT VFR BLD CALC: 37.7 % (ref 36.3–47.1)
HEMOCCULT SP1 STL QL: POSITIVE
HEMOCCULT SP2 STL QL: ABNORMAL
HEMOCCULT SP3 STL QL: ABNORMAL
HEMOGLOBIN: 11.8 G/DL (ref 11.9–15.1)
POTASSIUM SERPL-SCNC: 5.9 MMOL/L (ref 3.7–5.3)
POTASSIUM SERPL-SCNC: 6.6 MMOL/L (ref 3.7–5.3)
SODIUM BLD-SCNC: 143 MMOL/L (ref 135–144)
TIME, STOOL #1: 1415
TIME, STOOL #2: ABNORMAL
TIME, STOOL #3: ABNORMAL
TOTAL PROTEIN: 6.6 G/DL (ref 6.4–8.3)

## 2020-05-18 PROCEDURE — 36415 COLL VENOUS BLD VENIPUNCTURE: CPT

## 2020-05-18 PROCEDURE — 6370000000 HC RX 637 (ALT 250 FOR IP): Performed by: FAMILY MEDICINE

## 2020-05-18 PROCEDURE — 6370000000 HC RX 637 (ALT 250 FOR IP): Performed by: INTERNAL MEDICINE

## 2020-05-18 PROCEDURE — 84132 ASSAY OF SERUM POTASSIUM: CPT

## 2020-05-18 PROCEDURE — 1200000000 HC SEMI PRIVATE

## 2020-05-18 PROCEDURE — 99233 SBSQ HOSP IP/OBS HIGH 50: CPT | Performed by: FAMILY MEDICINE

## 2020-05-18 PROCEDURE — 80053 COMPREHEN METABOLIC PANEL: CPT

## 2020-05-18 PROCEDURE — 2580000003 HC RX 258: Performed by: NURSE PRACTITIONER

## 2020-05-18 PROCEDURE — 82272 OCCULT BLD FECES 1-3 TESTS: CPT

## 2020-05-18 PROCEDURE — 85014 HEMATOCRIT: CPT

## 2020-05-18 PROCEDURE — 2580000003 HC RX 258: Performed by: FAMILY MEDICINE

## 2020-05-18 PROCEDURE — 85018 HEMOGLOBIN: CPT

## 2020-05-18 RX ORDER — METOPROLOL TARTRATE 100 MG/1
100 TABLET ORAL 2 TIMES DAILY
Qty: 60 TABLET | Refills: 3 | Status: SHIPPED | OUTPATIENT
Start: 2020-05-18

## 2020-05-18 RX ORDER — SODIUM CHLORIDE 9 MG/ML
INJECTION, SOLUTION INTRAVENOUS CONTINUOUS
Status: DISCONTINUED | OUTPATIENT
Start: 2020-05-18 | End: 2020-05-19 | Stop reason: HOSPADM

## 2020-05-18 RX ORDER — DIGOXIN 125 MCG
125 TABLET ORAL DAILY
Qty: 30 TABLET | Refills: 3 | Status: SHIPPED | OUTPATIENT
Start: 2020-05-19

## 2020-05-18 RX ORDER — 0.9 % SODIUM CHLORIDE 0.9 %
500 INTRAVENOUS SOLUTION INTRAVENOUS ONCE
Status: COMPLETED | OUTPATIENT
Start: 2020-05-18 | End: 2020-05-18

## 2020-05-18 RX ORDER — DILTIAZEM HYDROCHLORIDE 120 MG/1
120 CAPSULE, COATED, EXTENDED RELEASE ORAL DAILY
Qty: 30 CAPSULE | Refills: 3 | Status: SHIPPED | OUTPATIENT
Start: 2020-05-19

## 2020-05-18 RX ORDER — DIGOXIN 125 MCG
125 TABLET ORAL DAILY
Status: DISCONTINUED | OUTPATIENT
Start: 2020-05-18 | End: 2020-05-19 | Stop reason: HOSPADM

## 2020-05-18 RX ADMIN — DIGOXIN 125 MCG: 125 TABLET ORAL at 11:09

## 2020-05-18 RX ADMIN — FLUOXETINE 20 MG: 20 CAPSULE ORAL at 08:31

## 2020-05-18 RX ADMIN — DILTIAZEM HYDROCHLORIDE 120 MG: 120 CAPSULE, COATED, EXTENDED RELEASE ORAL at 08:31

## 2020-05-18 RX ADMIN — APIXABAN 2.5 MG: 2.5 TABLET, FILM COATED ORAL at 08:31

## 2020-05-18 RX ADMIN — SODIUM CHLORIDE: 9 INJECTION, SOLUTION INTRAVENOUS at 14:17

## 2020-05-18 RX ADMIN — SODIUM CHLORIDE 500 ML: 9 INJECTION, SOLUTION INTRAVENOUS at 08:32

## 2020-05-18 RX ADMIN — METOPROLOL TARTRATE 100 MG: 100 TABLET ORAL at 08:31

## 2020-05-18 NOTE — PROGRESS NOTES
mL/min    GFR  25 (L) >60 mL/min    GFR Comment          GFR Staging         Troponin   Result Value Ref Range    Troponin, High Sensitivity 66 (HH) 0 - 14 ng/L    Troponin T NOT REPORTED <0.03 ng/mL    Troponin Interp NOT REPORTED    Brain Natriuretic Peptide   Result Value Ref Range    Pro-BNP 6,292 (H) <300 pg/mL    BNP Interpretation Pro-BNP Reference Range:    Urinalysis with Microscopic   Result Value Ref Range    Color, UA YELLOW YELLOW    Turbidity UA CLEAR CLEAR    Glucose, Ur NEGATIVE NEGATIVE    Bilirubin Urine NEGATIVE NEGATIVE    Ketones, Urine NEGATIVE NEGATIVE    Specific Gravity, UA 1.025 (H) 1.010 - 1.020    Urine Hgb NEGATIVE NEGATIVE    pH, UA 5.5 5.0 - 9.0    Protein, UA NEGATIVE NEGATIVE    Urobilinogen, Urine Normal Normal    Nitrite, Urine NEGATIVE NEGATIVE    Leukocyte Esterase, Urine NEGATIVE NEGATIVE    Urinalysis Comments NOT REPORTED     -          WBC, UA None 0 - 5 /HPF    RBC, UA None 0 - 2 /HPF    Casts UA NOT REPORTED NOT REPORTED /LPF    Crystals, UA NOT REPORTED None /HPF    Epithelial Cells UA 2 TO 5 0 - 25 /HPF    Renal Epithelial, UA NOT REPORTED 0 /HPF    Bacteria, UA TRACE (A) None    Mucus, UA NOT REPORTED None    Trichomonas, UA NOT REPORTED None    Amorphous, UA 1+ (A) None    Other Observations UA NOT REPORTED NOT REQ.     Yeast, UA NOT REPORTED None   Troponin   Result Value Ref Range    Troponin, High Sensitivity 60 (HH) 0 - 14 ng/L    Troponin T NOT REPORTED <0.03 ng/mL    Troponin Interp NOT REPORTED    CBC   Result Value Ref Range    WBC 11.0 3.5 - 11.3 k/uL    RBC 3.51 (L) 3.95 - 5.11 m/uL    Hemoglobin 11.6 (L) 11.9 - 15.1 g/dL    Hematocrit 36.1 (L) 36.3 - 47.1 %    .8 82.6 - 102.9 fL    MCH 33.0 25.2 - 33.5 pg    MCHC 32.1 28.4 - 34.8 g/dL    RDW 14.2 11.8 - 14.4 %    Platelets 441 015 - 412 k/uL    MPV 13.0 8.1 - 13.5 fL    NRBC Automated 0.0 0.0 per 100 WBC   Comprehensive Metabolic Panel w/ Reflex to MG   Result Value Ref Range    Glucose

## 2020-05-18 NOTE — PROGRESS NOTES
Leonid Lara MD   dilTIAZem (CARDIZEM CD) 180 MG extended release capsule Take 1 capsule by mouth daily 5/8/20  Yes Leonid Lara MD   metoprolol tartrate (LOPRESSOR) 50 MG tablet Take 1 tablet by mouth 2 times daily 5/8/20  Yes Leonid Lara MD   FLUoxetine (PROZAC) 20 MG capsule Take 1 capsule by mouth daily Take 20 mg by mouth daily 5/8/20  Yes Leonid Lara MD   atorvastatin (LIPITOR) 20 MG tablet Take 1 tablet by mouth daily  Patient taking differently: Take 20 mg by mouth nightly  5/8/20  Yes Leonid Lara MD   potassium chloride (MICRO-K) 10 MEQ extended release capsule Take 1 capsule by mouth daily 5/8/20  Yes Leonid Lara MD   apixaban (ELIQUIS) 2.5 MG TABS tablet Take 1 tablet by mouth 2 times daily 3/25/20  Yes ALEYDA Alba CNP   spironolactone (ALDACTONE) 25 MG tablet Take 1 tablet by mouth daily 2/10/20  Yes Anthony Graham MD   Incontinence Supply Disposable (DISPOSABLE BRIEF MEDIUM) MISC USE AS NEEDED FOR INCONTINENT EPISODES 2/27/20   Leonid Lara MD   Incontinence Supply Disposable (ALWAYS FEMININE WIPES) MISC USE AS NEEDED FOR INCONTINENT EPISODES 2/27/20   Leonid Lara MD   docusate sodium (COLACE) 100 MG capsule Take 1 capsule by mouth 2 times daily as needed for Constipation 1/28/20   ALEYDA Alba CNP   hydrALAZINE (APRESOLINE) 10 MG tablet Take 1 tablet by mouth 2 times daily as needed (90) Prn for bp >140 mm of hg 1/3/20   Leonid Lara MD   acetaminophen (TYLENOL) 500 MG tablet Take 500 mg by mouth every 4 hours as needed for Pain    Historical Provider, MD   Incontinence Supply Disposable (FITTED BRIEFS MEDIUM) MISC USE AS NEEDED FOR INCONTINENCE 10/1/19   Leonid Lara MD   Diapers & Supplies (GOODNITES BED MATS) [address] [facility] USE AS NEEDED FOR INCONTINENCE 10/1/19   Leonid Lara MD   Incontinence Supply Disposable (ALWAYS FEMININE WIPES) MISC USE AS NEEDED FOR INCONTINENCE 10/1/19   Leonid Lara MD       FAMILY Date Noted    Severe dehydration      Priority: High    Encounter for current long-term use of anticoagulants 02/10/2020     Priority: High    Weakness due to old stroke 05/18/2020    Chronic diastolic CHF (congestive heart failure), NYHA class 3 ([address] 05/17/2020    Orthostatic hypotension 05/17/2020    Acute renal failure (HCC) secondary to diuretics / Overdiuresis 05/17/2020    Acute renal failure (ARF) ([address] 05/17/2020    Altered mental status 05/16/2020    Septic shock ([address] 03/20/2020    Bradycardia 03/20/2020    Pneumonia of right middle lobe due to infectious organism ([address] 03/20/2020    Pleural effusion, bilateral 03/20/2020    Atrial fibrillation with rapid ventricular response ([address] 01/24/2020    Ischemic stroke (HCC)     Paroxysmal atrial fibrillation ([address] 08/02/2017    Hyperlipidemia 05/03/2016    Right shoulder pain 05/03/2016    Recurrent major depressive disorder, in partial remission ([address]     Diabetes mellitus without complication ([address]     Osteoarthritis     Essential hypertension         PLAN:   Chronic diastolic heart failure: New York Heart Association Class: III: Likely her baseline and probably dehydrated   Beta Blocker: Continue Metoprolol tartrate (Lopressor) 100 mg bid.  Diuretics: Not indicated at this time.  Heart failure counseling: I advised them to try and keep their legs up whenever possible and to limit salt in their diet. · Paroxysmal Atrial Fibrillation: Rate Control Symptomatic. May consider starting amiodarone but would 1st get her rehydrated, gentle rate control strategy and convert to rhythm control strategy if still symptomatic and in atrial fibrillation with RVR   · Beta Blocker: Increase Metoprolol tartrate (Lopressor) 100 mg bid. Titrate up as needed to keep HR<110  · Calcium Channel Blocker: Continue cardizem CD to 120 mg daily. May be able to stop if HR comes under better control.   · Stroke Risk: CHADS2-VASc Score: 7/9 (9.6% stroke

## 2020-05-18 NOTE — PROGRESS NOTES
Discussed discharge plans with the daughter. Patient is a 80year old female here with acute renal failure. She is alert but confused. Patient is a  and lives in her home with the help of her daughter. She uses a transfer belt , wheelchair , shower chair , and raised toilet seat. The daughter does all the cooking and the cleaning. Patient requires total assistance with her ADL's. The daughter manages her medication and the transportation. Patient was recently discharge from [address] [place] [address]. Her PCP is Dr. Tangela Subramanian. She has medical insurance that helps with medication costs. The discharge plan is pending. The Doctor spoke with daughter about hospice. She is going to talk it over with her family before making any decision. Patient has a DNR-CCA and advance directives on file. LSW to monitor and assist with any needs or concerns as they arise.     JIMMIE Porter

## 2020-05-18 NOTE — FLOWSHEET NOTE
Patients daughter called and informed that one visitor may come to see her mother.  States she will let her brother come to see her mother

## 2020-05-18 NOTE — PROGRESS NOTES
Spoke with Dr. Candice Mckinley regarding patients 's-140's. Dr. Candice Mckinley ordered . 25mg Digoxin IV one time. Will continue to monitor.

## 2020-05-18 NOTE — PROGRESS NOTES
[facility] will be meeting with the family to get equipment and services set up for home on Tuesday 5/19/2020.   JIMMIE Jade

## 2020-05-18 NOTE — PROGRESS NOTES
Nutrition Assessment    Type and Reason for Visit: Initial, Positive Nutrition Screen, Consult(MST 1: PO, pocketing, wounds. c/s wounds, wt loss, maln)    Nutrition Recommendations:   1. Modify diet to dysphagia I puree. 2. Add 4 oz strawberry ensure enlive TID with meals. Nutrition Assessment: Severe malnutrition r/t insufficient energy intakes aeb weight loss > 7.5% x 3 months, severe fat/muscle losses, diet Hx poor PO. Pt daughter states Pt would take bites of food, then refuse any more to eat. She was drinking ensure at home (strawberry or vanilla). Pt likes sweets and was drinking apple juice at home. States she was offering puree foods at home. Renal indices are higher, K+ 5.9-also higher and elevated. A1c with good control in 2019. Corrected calcium 9.34. Hospice plan. Pt with wounds, poor outcome expected with Hospice plan, comfort is goal. Daughter states she feels like her mother Ellyn Acosta given up. \" Pt not eating or drinking much at all, with ensure enlive she will receive 840 mg K+. No other dietary sources of K+ consumed due to poor PO. Corrected  Monitor need to change to ensure clear-likes apple flavored beverages. Malnutrition Assessment:  · Malnutrition Status: Meets the criteria for severe malnutrition  · Context: Acute illness or injury  · Findings of the 6 clinical characteristics of malnutrition (Minimum of 2 out of 6 clinical characteristics is required to make the diagnosis of moderate or severe Protein Calorie Malnutrition based on AND/ASPEN Guidelines):  1. Energy Intake-Less than or equal to 50% of estimated energy requirement, Greater than or equal to 7 days    2. Weight Loss-10% loss or greater, in 3 months  3. Fat Loss-Severe subcutaneous fat loss, Fat overlying the ribs, Orbital  4. Muscle Loss-Severe muscle mass loss, Interosseous  5. Fluid Accumulation-No significant fluid accumulation,    6.   Strength-Not measured    Nutrition Risk Level: High    Nutrient

## 2020-05-18 NOTE — FLOWSHEET NOTE
Decline in patients condition from this morning. Per Supervisor [facility] [place] RN may allow 1 family member to visit patient this evening only if she has the proper PPE on.

## 2020-05-19 NOTE — FLOWSHEET NOTE
Spoke with Dr. Addy Pedraza regarding family's wishes to take patient home this evening versus tomorrow with hospice. Discharge orders received at this time.

## 2020-05-19 NOTE — FLOWSHEET NOTE
2135 - Left a voicemail message for Juliet from hospice that family had taken patient home. 2220 - Still no return call from [facility]. Hospice mainline called, and put through to [facility]. Informed her that the patient had gone home with family. She stated she would reach out to them and touch base.

## 2020-05-19 NOTE — FLOWSHEET NOTE
Updated patient's daughter that no transport available until morning. She stated that she had talked with several family members, and they were going to come and help transport the patient back home.

## 2020-05-20 NOTE — DISCHARGE SUMMARY
Physician Discharge Summary  Steve Vora MD       Patient ID:  Tony Lee  [id number]  1/23/1933    Admission date: 5/16/2020    Discharge date: 5/17/2020    Admitting Physician: No att. providers found     Primary Care Physician: Vickie Atkinson MD     Primary Discharge Diagnoses:   Patient Active Problem List    Diagnosis Date Noted    Severe dehydration      Priority: High    Encounter for current long-term use of anticoagulants 02/10/2020     Priority: High    Severe malnutrition ([address] 05/18/2020     Priority: Medium     Class: Present on Admission    Weakness due to old stroke 05/18/2020    Chronic diastolic CHF (congestive heart failure), NYHA class 3 ([address] 05/17/2020    Orthostatic hypotension 05/17/2020    Acute renal failure (HCC) secondary to diuretics / Overdiuresis 05/17/2020    Acute renal failure (ARF) ([address] 05/17/2020    Altered mental status 05/16/2020    Septic shock ([address] 03/20/2020    Bradycardia 03/20/2020    Pneumonia of right middle lobe due to infectious organism ([address] 03/20/2020    Pleural effusion, bilateral 03/20/2020    Atrial fibrillation with rapid ventricular response ([address] 01/24/2020    Ischemic stroke (HCC)     Paroxysmal atrial fibrillation ([address] 08/02/2017    Hyperlipidemia 05/03/2016    Right shoulder pain 05/03/2016    Recurrent major depressive disorder, in partial remission ([address]     Diabetes mellitus without complication ([address]     Osteoarthritis     Essential hypertension        Additional Diagnoses:       Diagnosis Date    Atrial fibrillation (HCC)     Cerebral artery occlusion with cerebral infarction ([address]     Dementia ([address]     Depressive disorder, not elsewhere classified     Osteoarthrosis, unspecified whether generalized or localized, unspecified site     Pure hypercholesterolemia     Type II or unspecified type diabetes mellitus without mention of complication, not stated as uncontrolled     Unspecified essential hypertension Physical exam:      Not examined        Hospital Course: The patient was admitted for the above. She was treated with iv fluids and continued to have poor response. Due to her multiple underlying conditions and altered mental status related to her previous cva,discussed with family and decided to refer for hospice care. Family decided to take her home and have hospice follow up on 5/19/2020  Consultants:    · cardiology    Procedures:    · none    Complications:   · none    Significant Diagnostic Studies:   Xr Chest 1 Vw    Result Date: 5/16/2020  EXAMINATION: ONE XRAY VIEW OF THE CHEST 5/16/2020 1:23 pm COMPARISON: 03/25/2020, 03/23/2020 HISTORY: ORDERING SYSTEM PROVIDED HISTORY: Rule out pneumonia TECHNOLOGIST PROVIDED HISTORY: Rule out pneumonia FINDINGS: The cardiomediastinal silhouette is within normal limits. There is no consolidation, pneumothorax or evidence for edema. No evidence for effusion. No acute osseous abnormality is identified. Interval resolution of basilar opacities and effusions. No acute airspace disease identified.      Recent Results (from the past 96 hour(s))   EKG 12 Lead    Collection Time: 05/16/20  2:25 PM   Result Value Ref Range    Ventricular Rate 89 BPM    Atrial Rate 111 BPM    QRS Duration 100 ms    Q-T Interval 400 ms    QTc Calculation (Bazett) 486 ms    R Axis -81 degrees    T Axis 53 degrees   Troponin    Collection Time: 05/16/20  2:35 PM   Result Value Ref Range    Troponin, High Sensitivity 60 (HH) 0 - 14 ng/L    Troponin T NOT REPORTED <0.03 ng/mL    Troponin Interp NOT REPORTED    CBC    Collection Time: 05/17/20  5:40 AM   Result Value Ref Range    WBC 11.0 3.5 - 11.3 k/uL    RBC 3.51 (L) 3.95 - 5.11 m/uL    Hemoglobin 11.6 (L) 11.9 - 15.1 g/dL    Hematocrit 36.1 (L) 36.3 - 47.1 %    .8 82.6 - 102.9 fL    MCH 33.0 25.2 - 33.5 pg    MCHC 32.1 28.4 - 34.8 g/dL    RDW 14.2 11.8 - 14.4 %    Platelets 605 546 - 541 k/uL    MPV 13.0 8.1 - 13.5 fL    NRBC M.D.  5/20/2020  2:00 PM

## 2020-05-21 LAB
CULTURE: NORMAL
CULTURE: NORMAL
Lab: NORMAL
Lab: NORMAL
SPECIMEN DESCRIPTION: NORMAL
SPECIMEN DESCRIPTION: NORMAL

## 2022-01-04 NOTE — FLOWSHEET NOTE
Spoke with Evelio Dominguez from [facility] regarding patient's family wanting to bring the patient home tonight versus tomorrow as arranged. She stated someone would be able to go to the home tonight. Meds would not be available, however the patient has not required any prn meds. Family is okay with proceeding to take patient home. negative - no change in level of consciousness

## 2022-08-26 NOTE — ED PROVIDER NOTES
Negative for polydipsia, polyphagia and polyuria. Genitourinary: Negative for decreased urine volume, difficulty urinating, dysuria, frequency and hematuria. Musculoskeletal: Negative for arthralgias, back pain and myalgias. Skin: Negative for pallor and rash. Allergic/Immunologic: Negative for food allergies and immunocompromised state. Neurological: Negative for dizziness, syncope, weakness and light-headedness. Episode of confusion   Hematological: Negative for adenopathy. Does not bruise/bleed easily. Psychiatric/Behavioral: Negative for behavioral problems and suicidal ideas. The patient is not nervous/anxious. Except as noted above the remainder of the review of systems was reviewed and negative. PAST MEDICAL HISTORY     Past Medical History:   Diagnosis Date    Atrial fibrillation [facility]     Depressive disorder, not elsewhere classified     Osteoarthrosis, unspecified whether generalized or localized, unspecified site     Pure hypercholesterolemia     Type II or unspecified type diabetes mellitus without mention of complication, not stated as uncontrolled     Unspecified essential hypertension          SURGICAL HISTORY       Past Surgical History:   Procedure Laterality Date    JOINT REPLACEMENT  1992         CURRENT MEDICATIONS       Previous Medications    ALENDRONATE (FOSAMAX) 70 MG TABLET    Take 1 tablet by mouth every 7 days    ASPIRIN 81 MG CHEWABLE TABLET    Take 81 mg by mouth daily.     DIGOXIN (LANOXIN) 125 MCG TABLET    Take 1 tablet by mouth daily    FELODIPINE (PLENDIL) 10 MG EXTENDED RELEASE TABLET    Take 1 tablet by mouth daily    FLUOXETINE (PROZAC) 10 MG CAPSULE    Take 1 capsule by mouth daily    GLIPIZIDE (GLUCOTROL) 10 MG TABLET    Take 1 tablet by mouth 2 times daily (before meals)    LISINOPRIL (PRINIVIL;ZESTRIL) 20 MG TABLET    Take 1 tablet by mouth daily    METFORMIN (GLUCOPHAGE) 500 MG TABLET    Take 1 tablet by mouth 2 times daily (with meals) METOPROLOL SUCCINATE (TOPROL XL) 25 MG EXTENDED RELEASE TABLET    Take 1 tablet by mouth 2 times daily    SIMVASTATIN (ZOCOR) 40 MG TABLET    Take 1 tablet by mouth every evening       ALLERGIES     Aldomet [methyldopa]    FAMILY HISTORY     History reviewed. No pertinent family history. SOCIAL HISTORY       Social History     Social History    Marital status:      Spouse name: N/A    Number of children: N/A    Years of education: N/A     Social History Main Topics    Smoking status: Never Smoker    Smokeless tobacco: Never Used    Alcohol use No    Drug use: Unknown    Sexual activity: Not Asked     Other Topics Concern    None     Social History Narrative    None       SCREENINGS   NIH Stroke Scale  Interval: Baseline  Level of Consciousness (1a. ): Alert  LOC Questions (1b. ): Answers both correctly  LOC Commands (1c. ): Obeys both correctly  Best Gaze (2. ): Normal  Visual (3. ): No visual loss  Facial Palsy (4. ): Normal  Motor Arm, Left (5a. ): No drift  Motor Arm, Right (5b. ): No drift  Motor Leg, Left (6a. ): No drift  Motor Leg, Right (6b. ): No drift  Limb Ataxia (7. ): Absent  Sensory (8. ): Normal  Best Language (9. ): No aphasia  Dysarthria (10. ): Normal  Extinction and Inattention (11): No neglect  Total: 0         PHYSICAL EXAM    (up to 7 for level 4, 8 or more for level 5)     ED Triage Vitals   BP Temp Temp Source Pulse Resp SpO2 Height Weight   02/23/18 1654 02/23/18 1645 02/23/18 1645 02/23/18 1645 02/23/18 1645 02/23/18 1645 -- 02/23/18 1645   (!) 200/84 93.4 °F (34.1 °C) Tympanic 59 12 100 %  150 lb (68 kg)       Physical Exam   Constitutional: She is oriented to person, place, and time. She appears well-developed and well-nourished. No distress. HENT:   Head: Normocephalic and atraumatic. Right Ear: External ear normal.   Left Ear: External ear normal.   Mouth/Throat: Oropharynx is clear and moist. No oropharyngeal exudate.    Eyes: Conjunctivae and EOM are Statement Selected